# Patient Record
Sex: FEMALE | Race: WHITE | NOT HISPANIC OR LATINO | Employment: UNEMPLOYED | URBAN - METROPOLITAN AREA
[De-identification: names, ages, dates, MRNs, and addresses within clinical notes are randomized per-mention and may not be internally consistent; named-entity substitution may affect disease eponyms.]

---

## 2023-03-22 ENCOUNTER — TELEPHONE (OUTPATIENT)
Dept: PRIMARY CARE | Facility: CLINIC | Age: 73
End: 2023-03-22
Payer: MEDICARE

## 2023-03-22 DIAGNOSIS — Z12.11 ENCOUNTER FOR SCREENING FOR MALIGNANT NEOPLASM OF COLON: Primary | ICD-10-CM

## 2023-03-22 DIAGNOSIS — Z12.31 ENCOUNTER FOR SCREENING MAMMOGRAM FOR MALIGNANT NEOPLASM OF BREAST: ICD-10-CM

## 2023-04-01 PROBLEM — R51.9 HEADACHE: Status: ACTIVE | Noted: 2023-04-01

## 2023-04-01 PROBLEM — R05.9 COUGH: Status: ACTIVE | Noted: 2023-04-01

## 2023-04-01 PROBLEM — M81.0 OSTEOPOROSIS: Status: ACTIVE | Noted: 2023-04-01

## 2023-04-01 PROBLEM — K21.9 GERD (GASTROESOPHAGEAL REFLUX DISEASE): Status: ACTIVE | Noted: 2023-04-01

## 2023-04-01 PROBLEM — I10 HTN (HYPERTENSION): Status: ACTIVE | Noted: 2023-04-01

## 2023-04-01 PROBLEM — M25.551 PAIN OF RIGHT HIP: Status: ACTIVE | Noted: 2023-04-01

## 2023-04-01 PROBLEM — B00.9 HERPES INFECTION: Status: ACTIVE | Noted: 2023-04-01

## 2023-04-01 PROBLEM — G43.109 MIGRAINE EQUIVALENT: Status: ACTIVE | Noted: 2023-04-01

## 2023-04-01 PROBLEM — M54.2 NECK PAIN: Status: ACTIVE | Noted: 2023-04-01

## 2023-04-01 PROBLEM — M15.9 OSTEOARTHRITIS OF MULTIPLE JOINTS: Status: ACTIVE | Noted: 2023-04-01

## 2023-04-01 PROBLEM — E55.9 MILD VITAMIN D DEFICIENCY: Status: ACTIVE | Noted: 2023-04-01

## 2023-04-01 PROBLEM — J32.9 SINUSITIS: Status: ACTIVE | Noted: 2023-04-01

## 2023-04-01 PROBLEM — E78.2 HYPERLIPEMIA, MIXED: Status: ACTIVE | Noted: 2023-04-01

## 2023-04-01 PROBLEM — E53.8 VITAMIN B12 DEFICIENCY: Status: ACTIVE | Noted: 2023-04-01

## 2023-04-01 PROBLEM — L65.9 HAIR LOSS: Status: ACTIVE | Noted: 2023-04-01

## 2023-04-01 PROBLEM — M50.20 HERNIATION OF INTERVERTEBRAL DISC OF CERVICAL REGION: Status: ACTIVE | Noted: 2023-04-01

## 2023-04-01 PROBLEM — M41.9 SCOLIOSIS: Status: ACTIVE | Noted: 2023-04-01

## 2023-04-01 PROBLEM — B34.9 VIRAL ILLNESS: Status: ACTIVE | Noted: 2023-04-01

## 2023-04-01 PROBLEM — R19.7 DIARRHEA: Status: ACTIVE | Noted: 2023-04-01

## 2023-04-01 PROBLEM — R79.9 ABNORMAL BLOOD CHEMISTRY: Status: ACTIVE | Noted: 2023-04-01

## 2023-04-01 PROBLEM — R09.81 NASAL CONGESTION: Status: ACTIVE | Noted: 2023-04-01

## 2023-04-01 PROBLEM — R53.81 MALAISE: Status: ACTIVE | Noted: 2023-04-01

## 2023-04-01 PROBLEM — R00.2 PALPITATIONS: Status: ACTIVE | Noted: 2023-04-01

## 2023-04-01 RX ORDER — SIMVASTATIN 40 MG/1
1 TABLET, FILM COATED ORAL DAILY
COMMUNITY
Start: 2020-11-02 | End: 2024-01-11

## 2023-04-01 RX ORDER — CYANOCOBALAMIN 1000 UG/ML
1000 INJECTION, SOLUTION INTRAMUSCULAR; SUBCUTANEOUS
COMMUNITY
End: 2023-06-09 | Stop reason: WASHOUT

## 2023-04-01 RX ORDER — OMEPRAZOLE 40 MG/1
40 CAPSULE, DELAYED RELEASE ORAL DAILY
COMMUNITY
End: 2023-05-17 | Stop reason: SDUPTHER

## 2023-04-01 RX ORDER — LYSINE HCL 500 MG
2 TABLET ORAL DAILY
COMMUNITY
Start: 2022-01-05 | End: 2023-06-09 | Stop reason: WASHOUT

## 2023-04-01 RX ORDER — ACYCLOVIR 400 MG/1
1 TABLET ORAL DAILY
COMMUNITY
Start: 2021-12-10

## 2023-04-01 RX ORDER — VIT C/E/ZN/COPPR/LUTEIN/ZEAXAN 250MG-90MG
25 CAPSULE ORAL DAILY
COMMUNITY
Start: 2020-01-08 | End: 2023-06-09 | Stop reason: WASHOUT

## 2023-04-01 RX ORDER — AMITRIPTYLINE HYDROCHLORIDE 10 MG/1
10 TABLET, FILM COATED ORAL NIGHTLY
COMMUNITY
End: 2023-04-03 | Stop reason: ALTCHOICE

## 2023-04-03 ENCOUNTER — OFFICE VISIT (OUTPATIENT)
Dept: PRIMARY CARE | Facility: CLINIC | Age: 73
End: 2023-04-03
Payer: MEDICARE

## 2023-04-03 VITALS
SYSTOLIC BLOOD PRESSURE: 118 MMHG | DIASTOLIC BLOOD PRESSURE: 62 MMHG | WEIGHT: 116 LBS | BODY MASS INDEX: 22.78 KG/M2 | HEART RATE: 60 BPM | TEMPERATURE: 97.5 F | RESPIRATION RATE: 16 BRPM | HEIGHT: 60 IN

## 2023-04-03 DIAGNOSIS — E78.2 HYPERLIPEMIA, MIXED: ICD-10-CM

## 2023-04-03 DIAGNOSIS — E53.8 FOLATE DEFICIENCY: ICD-10-CM

## 2023-04-03 DIAGNOSIS — R79.9 ABNORMAL BLOOD CHEMISTRY: ICD-10-CM

## 2023-04-03 DIAGNOSIS — N39.0 URINARY TRACT INFECTION WITHOUT HEMATURIA, SITE UNSPECIFIED: Primary | ICD-10-CM

## 2023-04-03 DIAGNOSIS — R10.9 ABDOMINAL PAIN, UNSPECIFIED ABDOMINAL LOCATION: ICD-10-CM

## 2023-04-03 DIAGNOSIS — Z00.00 ROUTINE GENERAL MEDICAL EXAMINATION AT HEALTH CARE FACILITY: ICD-10-CM

## 2023-04-03 DIAGNOSIS — K21.9 GASTROESOPHAGEAL REFLUX DISEASE, UNSPECIFIED WHETHER ESOPHAGITIS PRESENT: ICD-10-CM

## 2023-04-03 DIAGNOSIS — E78.5 HYPERLIPIDEMIA, UNSPECIFIED HYPERLIPIDEMIA TYPE: ICD-10-CM

## 2023-04-03 DIAGNOSIS — E55.9 MILD VITAMIN D DEFICIENCY: ICD-10-CM

## 2023-04-03 DIAGNOSIS — I10 PRIMARY HYPERTENSION: ICD-10-CM

## 2023-04-03 DIAGNOSIS — E03.9 HYPOTHYROIDISM, UNSPECIFIED TYPE: ICD-10-CM

## 2023-04-03 DIAGNOSIS — R53.81 MALAISE: ICD-10-CM

## 2023-04-03 LAB
APPEARANCE UR: CLEAR
BILIRUB UR QL STRIP: NEGATIVE
COLOR UR: YELLOW
GLUCOSE UR STRIP-MCNC: NEGATIVE MG/DL
HGB UR QL STRIP: ABNORMAL
KETONES UR STRIP-MCNC: NEGATIVE MG/DL
LEUKOCYTE ESTERASE UR QL STRIP: NEGATIVE
NITRITE UR QL STRIP: NEGATIVE
PH UR STRIP: 6.5 [PH]
PROT UR STRIP-MCNC: NEGATIVE MG/DL
SP GR UR STRIP.AUTO: 1.01
UROBILINOGEN UR STRIP-ACNC: 0.2 E.U./DL

## 2023-04-03 PROCEDURE — 83036 HEMOGLOBIN GLYCOSYLATED A1C: CPT | Performed by: INTERNAL MEDICINE

## 2023-04-03 PROCEDURE — 1157F ADVNC CARE PLAN IN RCRD: CPT | Performed by: INTERNAL MEDICINE

## 2023-04-03 PROCEDURE — 99213 OFFICE O/P EST LOW 20 MIN: CPT | Performed by: INTERNAL MEDICINE

## 2023-04-03 PROCEDURE — 82306 VITAMIN D 25 HYDROXY: CPT | Performed by: INTERNAL MEDICINE

## 2023-04-03 PROCEDURE — 1160F RVW MEDS BY RX/DR IN RCRD: CPT | Performed by: INTERNAL MEDICINE

## 2023-04-03 PROCEDURE — 80053 COMPREHEN METABOLIC PANEL: CPT | Performed by: INTERNAL MEDICINE

## 2023-04-03 PROCEDURE — 82746 ASSAY OF FOLIC ACID SERUM: CPT | Performed by: INTERNAL MEDICINE

## 2023-04-03 PROCEDURE — 1170F FXNL STATUS ASSESSED: CPT | Performed by: INTERNAL MEDICINE

## 2023-04-03 PROCEDURE — 3074F SYST BP LT 130 MM HG: CPT | Performed by: INTERNAL MEDICINE

## 2023-04-03 PROCEDURE — 1159F MED LIST DOCD IN RCRD: CPT | Performed by: INTERNAL MEDICINE

## 2023-04-03 PROCEDURE — 1036F TOBACCO NON-USER: CPT | Performed by: INTERNAL MEDICINE

## 2023-04-03 PROCEDURE — 84443 ASSAY THYROID STIM HORMONE: CPT | Performed by: INTERNAL MEDICINE

## 2023-04-03 PROCEDURE — 3078F DIAST BP <80 MM HG: CPT | Performed by: INTERNAL MEDICINE

## 2023-04-03 PROCEDURE — 81003 URINALYSIS AUTO W/O SCOPE: CPT | Performed by: INTERNAL MEDICINE

## 2023-04-03 PROCEDURE — 36415 COLL VENOUS BLD VENIPUNCTURE: CPT | Performed by: INTERNAL MEDICINE

## 2023-04-03 PROCEDURE — 80061 LIPID PANEL: CPT | Performed by: INTERNAL MEDICINE

## 2023-04-03 PROCEDURE — G0439 PPPS, SUBSEQ VISIT: HCPCS | Performed by: INTERNAL MEDICINE

## 2023-04-03 PROCEDURE — 85025 COMPLETE CBC W/AUTO DIFF WBC: CPT | Performed by: INTERNAL MEDICINE

## 2023-04-03 RX ORDER — LISINOPRIL 2.5 MG/1
2.5 TABLET ORAL DAILY
Qty: 30 TABLET | Refills: 11 | Status: SHIPPED | OUTPATIENT
Start: 2023-04-03 | End: 2023-04-10 | Stop reason: SDUPTHER

## 2023-04-03 RX ORDER — DICYCLOMINE HYDROCHLORIDE 10 MG/1
10 CAPSULE ORAL 4 TIMES DAILY PRN
Qty: 30 CAPSULE | Refills: 0 | Status: SHIPPED | OUTPATIENT
Start: 2023-04-03 | End: 2023-04-10 | Stop reason: SDUPTHER

## 2023-04-03 ASSESSMENT — ENCOUNTER SYMPTOMS
ARTHRALGIAS: 1
DYSURIA: 1
HEADACHES: 0
COUGH: 0
APNEA: 0
APPETITE CHANGE: 0
ACTIVITY CHANGE: 0
LOSS OF SENSATION IN FEET: 0
DEPRESSION: 0
SHORTNESS OF BREATH: 0
ABDOMINAL DISTENTION: 0
DIFFICULTY URINATING: 1
OCCASIONAL FEELINGS OF UNSTEADINESS: 0

## 2023-04-03 ASSESSMENT — ACTIVITIES OF DAILY LIVING (ADL)
BATHING: INDEPENDENT
GROOMING: INDEPENDENT
WALKS IN HOME: INDEPENDENT
PATIENT'S MEMORY ADEQUATE TO SAFELY COMPLETE DAILY ACTIVITIES?: YES
DOING HOUSEWORK: INDEPENDENT
HEARING - LEFT EAR: FUNCTIONAL
USING TELEPHONE: INDEPENDENT
DRESSING: INDEPENDENT
FEEDING YOURSELF: INDEPENDENT
USING TRANSPORTATION: INDEPENDENT
STIL DRIVING: YES
PREPARING MEALS: INDEPENDENT
TAKING MEDICATION: INDEPENDENT
ADEQUATE_TO_COMPLETE_ADL: YES
TOILETING: INDEPENDENT
EATING: INDEPENDENT
GROCERY SHOPPING: INDEPENDENT
BATHING: INDEPENDENT
TOILETING: INDEPENDENT
PILL BOX USED: NO
JUDGMENT_ADEQUATE_SAFELY_COMPLETE_DAILY_ACTIVITIES: YES
HEARING - RIGHT EAR: FUNCTIONAL
DRESSING YOURSELF: INDEPENDENT
JUDGMENT_ADEQUATE_SAFELY_COMPLETE_DAILY_ACTIVITIES: YES
FEEDING: INDEPENDENT
MANAGING FINANCES: INDEPENDENT
ADEQUATE_TO_COMPLETE_ADL: YES
NEEDS ASSISTANCE WITH FOOD: INDEPENDENT

## 2023-04-03 ASSESSMENT — COLUMBIA-SUICIDE SEVERITY RATING SCALE - C-SSRS

## 2023-04-03 ASSESSMENT — PATIENT HEALTH QUESTIONNAIRE - PHQ9
SUM OF ALL RESPONSES TO PHQ9 QUESTIONS 1 AND 2: 0
SUM OF ALL RESPONSES TO PHQ9 QUESTIONS 1 AND 2: 0
2. FEELING DOWN, DEPRESSED OR HOPELESS: NOT AT ALL
2. FEELING DOWN, DEPRESSED OR HOPELESS: NOT AT ALL
1. LITTLE INTEREST OR PLEASURE IN DOING THINGS: NOT AT ALL
1. LITTLE INTEREST OR PLEASURE IN DOING THINGS: NOT AT ALL

## 2023-04-03 ASSESSMENT — PAIN SCALES - GENERAL: PAINLEVEL: 0-NO PAIN

## 2023-04-03 NOTE — PROGRESS NOTES
Subjective         Patient is presented for follow up.  Patient is complaining of urgency, frequency.  Has low energy.  Complaining of bilateral axillary discomfort.  In need for blood work.  Weak.    HPI      This is a 73 year old lady with history of scoliosis, hyperlipidemia, chronic gastritis, status post bilateral cataract removal, s/p colonoscopy 2017, s/p COVID 1/12/2021, osteoporosis with lumbar spine T score -4.5 12/2021.  Patient is presented for evaluation and treatment of the above complaints.  Has urgency, frequency.  Has bilateral axillary discomfort.  Weak at time.  In need for Blood work.     Patient is followed by rheumatology.  Currently, taking Reclast.  Mammography is scheduled in 4/2023.  Interested to have her blood work done.     Review of Systems   Constitutional:  Negative for activity change and appetite change.   HENT:  Negative for congestion.    Respiratory:  Negative for apnea, cough and shortness of breath.    Cardiovascular:  Negative for chest pain and leg swelling.   Gastrointestinal:  Negative for abdominal distention.   Genitourinary:  Positive for difficulty urinating and dysuria.   Musculoskeletal:  Positive for arthralgias.   Neurological:  Negative for headaches.       Objective        Vitals:    04/03/23 0907   BP: 118/62   Pulse: 60   Resp: 16   Temp: 36.4 °C (97.5 °F)        Physical Exam  Constitutional:       Appearance: Normal appearance.   HENT:      Head: Normocephalic.      Right Ear: Tympanic membrane normal.      Left Ear: Tympanic membrane normal.      Nose: Nose normal.      Mouth/Throat:      Mouth: Mucous membranes are moist.   Eyes:      Pupils: Pupils are equal, round, and reactive to light.   Cardiovascular:      Rate and Rhythm: Normal rate and regular rhythm.      Pulses: Normal pulses.   Abdominal:      Palpations: Abdomen is soft.   Musculoskeletal:         General: Deformity present.      Cervical back: Normal range of motion.      Comments: Moderate  Scoliosis   Skin:     General: Skin is warm and dry.   Neurological:      General: No focal deficit present.      Mental Status: She is alert and oriented to person, place, and time.   Psychiatric:         Mood and Affect: Mood normal.         Behavior: Behavior normal.         Thought Content: Thought content normal.       Diagnoses and all orders for this visit:  Urinary tract infection without hematuria, site unspecified (Primary)  -     POCT UA (Automated) docked device  Mild vitamin D deficiency  -     Vitamin D 1,25 Dihydroxy  Hyperlipemia, mixed  -     Comprehensive Metabolic Panel  Malaise  -     CBC  Abnormal blood chemistry  -     Hemoglobin A1C  -     Thyroid Stimulating Hormone  Hyperlipidemia, unspecified hyperlipidemia type  -     Lipid Panel  Folate deficiency  -     Folate  Hypothyroidism, unspecified type  -     Thyroid Stimulating Hormone  Primary hypertension  -     lisinopril 2.5 mg tablet; Take 1 tablet (2.5 mg) by mouth once daily.  Gastroesophageal reflux disease, unspecified whether esophagitis present  Abdominal pain, unspecified abdominal location  -     dicyclomine (Bentyl) 10 mg capsule; Take 1 capsule (10 mg) by mouth 4 times a day as needed (abdominal pain or cramps).  Other orders  -     POCT URINALYSIS AUTOMATED     - Bilateral axillary discomfort.  Mammography is in 4/2023.  No masses, tenderness.    - R hip pain.   Resolved.  Take Tylenol as needed.     - Hyperlipidemia.  Take simvastatin as directed.  Keep Mediterranean diet, exercise.     - H of acute sinusitis.  Had completed doxycycline, benzonatate.  Improved.     - Osteoporosis.  Last DEXA scan was done 12/21/2021.  T score at her LS spine -4.5.  Evaluated by rheumatology, had 2 injections of Reclast.  Has incoming appointment with rheumatology January 2023.  Calcium supplements, vitamin D.  Exercises much as you can.     - Headaches episodes.  Discussed with patient about CT scan results.  Started on physical therapy.  Take  amitriptyline daily.  Improved.     - Frequent herpes outbreaks.  Continue with herpes prophylaxis.     - H of COVID -19 illness 1/2021.  Patient has had vaccination in August.  Slowly recovered.     - Hypertension.  Stable.  Continue to take current therapy.   Exercise.  Avoid salt.     - Scoliosis.  Continue with daily exercise.     - Health maintenance.  GYN exam Dr. Sierra.  Mammography is up to date.  Colonoscopy 2017, done at [facility].  Vaccinations.     - Normal Adult weight with BMI 22.65.  Diet, exercise.  Keep ideal BMI less than 25.     Follow up in 1 mon   Alexia Perry MD

## 2023-04-04 NOTE — PROGRESS NOTES
Subjective   Reason for Visit: Sandra Brown is an 73 y.o. female here for a Medicare Wellness visit.     Past Medical, Surgical, and Family History reviewed and updated in chart.    Reviewed all medications by prescribing practitioner or clinical pharmacist (such as prescriptions, OTCs, herbal therapies and supplements) and documented in the medical record.    HPI    Patient Care Team:  Alexia Perry MD as PCP - General  Masood Dubose DO as PCP - [employer] PCP     Review of Systems    Objective   Vitals:  /62   Pulse 60   Temp 36.4 °C (97.5 °F) (Temporal)   Resp 16   Ht 1.524 m (5')   Wt 52.6 kg (116 lb)   BMI 22.65 kg/m²       Physical Exam    Assessment/Plan   Problem List Items Addressed This Visit        Circulatory    HTN (hypertension)    Relevant Medications    lisinopril 2.5 mg tablet       Digestive    GERD (gastroesophageal reflux disease)       Endocrine/Metabolic    Mild vitamin D deficiency    Relevant Orders    Vitamin D 1,25 Dihydroxy (Completed)       Other    Abnormal blood chemistry    Relevant Orders    Hemoglobin A1C (Completed)    Thyroid Stimulating Hormone (Completed)    Hyperlipemia, mixed    Relevant Orders    Comprehensive Metabolic Panel (Completed)    Malaise    Relevant Orders    CBC (Completed)   Other Visit Diagnoses     Urinary tract infection without hematuria, site unspecified    -  Primary    Relevant Orders    POCT UA (Automated) docked device    Hyperlipidemia, unspecified hyperlipidemia type        Relevant Orders    Lipid Panel (Completed)    Folate deficiency        Relevant Orders    Folate (Completed)    Hypothyroidism, unspecified type        Relevant Orders    Thyroid Stimulating Hormone (Completed)    Abdominal pain, unspecified abdominal location        Relevant Medications    dicyclomine (Bentyl) 10 mg capsule    Routine general medical examination at health care facility

## 2023-04-10 ENCOUNTER — TELEPHONE (OUTPATIENT)
Dept: PRIMARY CARE | Facility: CLINIC | Age: 73
End: 2023-04-10
Payer: MEDICARE

## 2023-04-10 DIAGNOSIS — R10.9 ABDOMINAL PAIN, UNSPECIFIED ABDOMINAL LOCATION: ICD-10-CM

## 2023-04-10 DIAGNOSIS — I10 PRIMARY HYPERTENSION: ICD-10-CM

## 2023-04-10 RX ORDER — DICYCLOMINE HYDROCHLORIDE 10 MG/1
10 CAPSULE ORAL 4 TIMES DAILY PRN
Qty: 30 CAPSULE | Refills: 3 | Status: SHIPPED | OUTPATIENT
Start: 2023-04-10 | End: 2023-06-09

## 2023-04-10 RX ORDER — LISINOPRIL 2.5 MG/1
2.5 TABLET ORAL DAILY
Qty: 30 TABLET | Refills: 11 | Status: SHIPPED | OUTPATIENT
Start: 2023-04-10 | End: 2023-12-18 | Stop reason: WASHOUT

## 2023-04-12 LAB — URINE CULTURE: NORMAL

## 2023-04-21 ENCOUNTER — OFFICE VISIT (OUTPATIENT)
Dept: PRIMARY CARE | Facility: CLINIC | Age: 73
End: 2023-04-21
Payer: MEDICARE

## 2023-04-21 DIAGNOSIS — Z12.11 ENCOUNTER FOR SCREENING FOR MALIGNANT NEOPLASM OF COLON: ICD-10-CM

## 2023-04-21 DIAGNOSIS — N76.1 CHRONIC VAGINITIS: Primary | ICD-10-CM

## 2023-04-21 DIAGNOSIS — R19.7 DIARRHEA, UNSPECIFIED TYPE: ICD-10-CM

## 2023-04-21 PROCEDURE — 1160F RVW MEDS BY RX/DR IN RCRD: CPT | Performed by: INTERNAL MEDICINE

## 2023-04-21 PROCEDURE — 1157F ADVNC CARE PLAN IN RCRD: CPT | Performed by: INTERNAL MEDICINE

## 2023-04-21 PROCEDURE — 1159F MED LIST DOCD IN RCRD: CPT | Performed by: INTERNAL MEDICINE

## 2023-04-21 PROCEDURE — 3078F DIAST BP <80 MM HG: CPT | Performed by: INTERNAL MEDICINE

## 2023-04-21 PROCEDURE — 3074F SYST BP LT 130 MM HG: CPT | Performed by: INTERNAL MEDICINE

## 2023-04-21 PROCEDURE — 99213 OFFICE O/P EST LOW 20 MIN: CPT | Performed by: INTERNAL MEDICINE

## 2023-04-21 PROCEDURE — 1036F TOBACCO NON-USER: CPT | Performed by: INTERNAL MEDICINE

## 2023-04-21 RX ORDER — LOPERAMIDE HCL 2 MG
2 TABLET ORAL 4 TIMES DAILY PRN
Qty: 30 TABLET | Refills: 0 | Status: SHIPPED | OUTPATIENT
Start: 2023-04-21 | End: 2023-05-01

## 2023-04-21 RX ORDER — ESTRADIOL 10 UG/1
10 INSERT VAGINAL 2 TIMES WEEKLY
Qty: 8 TABLET | Refills: 2 | COMMUNITY
Start: 2023-04-24 | End: 2023-08-17 | Stop reason: WASHOUT

## 2023-04-21 NOTE — PROGRESS NOTES
Subjective    Sandra Brown is a 73 y.o. female who presents for follow up.  Seen by DR Leal.  Patient is interested for follow up.    HPI  This is a 73 year old lady with history of scoliosis, hyperlipidemia, chronic gastritis, status post bilateral cataract removal, s/p colonoscopy 2017, s/p COVID 1/12/2021, osteoporosis with lumbar spine T score -4.5 12/2021.  Patient is presented for evaluation and treatment of the above complaints.  Followed  by Gynecology/ urology.  Has had transvaginal, abdominal ultrasound.  Patient has increase uterine thickness, due to Biopsy.     Patient is followed by rheumatology.  Currently, taking Reclast.  Mammography is scheduled in 4/2023.        Review of Systems   Constitutional:  Negative for activity change, appetite change and unexpected weight change.   Respiratory:  Negative for cough and shortness of breath.    Gastrointestinal:  Positive for diarrhea. Negative for abdominal distention and abdominal pain.   Genitourinary:  Positive for dysuria and urgency.   Musculoskeletal:  Positive for back pain.       Objective        Vitals:    04/21/23 1011   BP: 118/62   Pulse: 64   Resp: 16   Temp: 36.4 °C (97.5 °F)        Physical Exam  HENT:      Head: Normocephalic.      Nose: Nose normal.      Mouth/Throat:      Mouth: Mucous membranes are moist.   Cardiovascular:      Rate and Rhythm: Normal rate.   Pulmonary:      Effort: Pulmonary effort is normal.   Abdominal:      Palpations: Abdomen is soft.   Musculoskeletal:      Right lower leg: No edema.      Left lower leg: No edema.   Skin:     General: Skin is warm.   Neurological:      General: No focal deficit present.      Mental Status: She is alert.   Psychiatric:         Mood and Affect: Mood normal.       Diagnoses and all orders for this visit:  Chronic vaginitis (Primary)  -     estradiol (Vagifem) 10 mcg tablet vaginal tablet; Insert 1 tablet (10 mcg) into the vagina 2 times a week.  Diarrhea, unspecified type  -      loperamide (Imodium A-D) 2 mg tablet; Take 1 tablet (2 mg) by mouth 4 times a day as needed for diarrhea for up to 10 days.  Encounter for screening for malignant neoplasm of colon  -     Colonoscopy; Future       Evaluated by Dr correa.  Had pelvic ultrasound  done, has increased endometrium, up to 9 mm, advised to have  Biopsy done.    - Bilateral axillary discomfort.  Mammography is in 6/7//2023.  No masses, tenderness.     - R hip pain.   Resolved.  Take Tylenol as needed.     - Hyperlipidemia.  Take simvastatin as directed.  Keep Mediterranean diet, exercise.     - H of acute sinusitis.  Had completed doxycycline, benzonatate.  Improved.     - Osteoporosis.  Last DEXA scan was done 12/21/2021.  T score at her LS spine -4.5.  Evaluated by rheumatology, had 2 injections of Reclast.  Has incoming appointment with rheumatology January 2023.  Calcium supplements, vitamin D.  Exercises much as you can.     - Headaches episodes.  Discussed with patient about CT scan results.  Started on physical therapy.  Take amitriptyline daily.  Improved.     - Frequent herpes outbreaks.  Continue with herpes prophylaxis.     - H of COVID -19 illness 1/2021.  Patient has had vaccination in August.  Slowly recovered.     - Hypertension.  Stable.  Continue to take current therapy.   Exercise.  Avoid salt.     - Scoliosis.  Continue with daily exercise.     - Health maintenance.  GYN exam Dr. Sierra.  Mammography is up to date.  Colonoscopy 2017, done at [place].  Vaccinations.     - Normal Adult weight with BMI 22.26.  Diet, exercise.  Keep ideal BMI less than 25.     Follow up in 1 mon      Alexia Perry MD

## 2023-04-23 VITALS
DIASTOLIC BLOOD PRESSURE: 62 MMHG | WEIGHT: 114 LBS | HEART RATE: 64 BPM | RESPIRATION RATE: 16 BRPM | TEMPERATURE: 97.5 F | HEIGHT: 60 IN | BODY MASS INDEX: 22.38 KG/M2 | SYSTOLIC BLOOD PRESSURE: 118 MMHG

## 2023-04-23 ASSESSMENT — ENCOUNTER SYMPTOMS
ABDOMINAL DISTENTION: 0
DYSURIA: 1
ABDOMINAL PAIN: 0
UNEXPECTED WEIGHT CHANGE: 0
ACTIVITY CHANGE: 0
BACK PAIN: 1
APPETITE CHANGE: 0
DIARRHEA: 1
COUGH: 0
SHORTNESS OF BREATH: 0

## 2023-05-04 LAB
CREATININE (MG/DL) IN SER/PLAS: 0.73 MG/DL (ref 0.5–1.05)
GFR FEMALE: 88 ML/MIN/1.73M2
POC CALCIUM IONIZED (MMOL/L) IN BLOOD: 1.31 MMOL/L (ref 1.1–1.33)

## 2023-05-10 ENCOUNTER — HOSPITAL ENCOUNTER (OUTPATIENT)
Dept: DATA CONVERSION | Facility: HOSPITAL | Age: 73
End: 2023-05-10
Attending: OBSTETRICS & GYNECOLOGY | Admitting: OBSTETRICS & GYNECOLOGY
Payer: MEDICARE

## 2023-05-10 DIAGNOSIS — M81.0 AGE-RELATED OSTEOPOROSIS WITHOUT CURRENT PATHOLOGICAL FRACTURE: ICD-10-CM

## 2023-05-10 DIAGNOSIS — E78.5 HYPERLIPIDEMIA, UNSPECIFIED: ICD-10-CM

## 2023-05-10 DIAGNOSIS — I10 ESSENTIAL (PRIMARY) HYPERTENSION: ICD-10-CM

## 2023-05-10 DIAGNOSIS — K21.9 GASTRO-ESOPHAGEAL REFLUX DISEASE WITHOUT ESOPHAGITIS: ICD-10-CM

## 2023-05-10 DIAGNOSIS — N84.0 POLYP OF CORPUS UTERI: ICD-10-CM

## 2023-05-10 DIAGNOSIS — R93.89 ABNORMAL FINDINGS ON DIAGNOSTIC IMAGING OF OTHER SPECIFIED BODY STRUCTURES: ICD-10-CM

## 2023-05-10 DIAGNOSIS — M15.9 POLYOSTEOARTHRITIS, UNSPECIFIED: ICD-10-CM

## 2023-05-10 DIAGNOSIS — N93.8 OTHER SPECIFIED ABNORMAL UTERINE AND VAGINAL BLEEDING: ICD-10-CM

## 2023-05-16 LAB
COMPLETE PATHOLOGY REPORT: NORMAL
CONVERTED CLINICAL DIAGNOSIS-HISTORY: NORMAL
CONVERTED FINAL DIAGNOSIS: NORMAL
CONVERTED FINAL REPORT PDF LINK TO COPY AND PASTE: NORMAL
CONVERTED GROSS DESCRIPTION: NORMAL

## 2023-05-17 ENCOUNTER — TELEPHONE (OUTPATIENT)
Dept: PRIMARY CARE | Facility: CLINIC | Age: 73
End: 2023-05-17
Payer: MEDICARE

## 2023-05-17 DIAGNOSIS — K21.9 GASTROESOPHAGEAL REFLUX DISEASE, UNSPECIFIED WHETHER ESOPHAGITIS PRESENT: Primary | ICD-10-CM

## 2023-05-17 RX ORDER — OMEPRAZOLE 40 MG/1
40 CAPSULE, DELAYED RELEASE ORAL DAILY
Qty: 90 CAPSULE | Refills: 2 | Status: SHIPPED | OUTPATIENT
Start: 2023-05-17 | End: 2023-05-22

## 2023-05-24 LAB — URINE CULTURE: NORMAL

## 2023-06-09 ENCOUNTER — OFFICE VISIT (OUTPATIENT)
Dept: PRIMARY CARE | Facility: CLINIC | Age: 73
End: 2023-06-09
Payer: MEDICARE

## 2023-06-09 VITALS — TEMPERATURE: 98.6 F | BODY MASS INDEX: 22.38 KG/M2 | HEIGHT: 60 IN | WEIGHT: 114 LBS

## 2023-06-09 DIAGNOSIS — R10.13 EPIGASTRIC PAIN: Primary | ICD-10-CM

## 2023-06-09 PROCEDURE — 1160F RVW MEDS BY RX/DR IN RCRD: CPT | Performed by: INTERNAL MEDICINE

## 2023-06-09 PROCEDURE — 1157F ADVNC CARE PLAN IN RCRD: CPT | Performed by: INTERNAL MEDICINE

## 2023-06-09 PROCEDURE — 1159F MED LIST DOCD IN RCRD: CPT | Performed by: INTERNAL MEDICINE

## 2023-06-09 PROCEDURE — 1036F TOBACCO NON-USER: CPT | Performed by: INTERNAL MEDICINE

## 2023-06-09 PROCEDURE — 99213 OFFICE O/P EST LOW 20 MIN: CPT | Performed by: INTERNAL MEDICINE

## 2023-06-09 RX ORDER — FAMOTIDINE 40 MG/1
40 TABLET, FILM COATED ORAL 2 TIMES DAILY
Qty: 60 TABLET | Refills: 0 | Status: SHIPPED | OUTPATIENT
Start: 2023-06-09 | End: 2023-08-17 | Stop reason: WASHOUT

## 2023-06-09 ASSESSMENT — ENCOUNTER SYMPTOMS
VOMITING: 0
DIARRHEA: 0
NAUSEA: 0
ABDOMINAL PAIN: 1
ABDOMINAL DISTENTION: 0
ACTIVITY CHANGE: 0
CONSTIPATION: 0

## 2023-06-09 NOTE — PROGRESS NOTES
Subjective    Sandra Brown is a 73 y.o. female who presents for  evaluation of abdominal discomfort, spasm like pain.    HPI    This is a 73 year old lady with history of scoliosis, hyperlipidemia, chronic gastritis, status post bilateral cataract removal, s/p colonoscopy 2017, s/p COVID 1/12/2021, osteoporosis with lumbar spine T score -4.5 12/2021.  Patient is presented for evaluation and treatment of the above complaints.  Has been having epigastric spasm, pain.  Interested to see GI.    Followed  by Gynecology/ urology.  Patient has increase uterine thickness,  had  Biopsy done, was negative     Patient is followed by rheumatology.  Currently, taking Reclast, next injection 5/2024.  Mammography is scheduled in 4/2023.     Review of Systems   Constitutional:  Negative for activity change.   Gastrointestinal:  Positive for abdominal pain. Negative for abdominal distention, constipation, diarrhea, nausea and vomiting.       Objective        Vitals:    06/09/23 1340   Temp: 37 °C (98.6 °F)        Physical Exam  Constitutional:       Appearance: Normal appearance.   HENT:      Head: Normocephalic and atraumatic.      Mouth/Throat:      Mouth: Mucous membranes are moist.   Pulmonary:      Breath sounds: Normal breath sounds.   Abdominal:      Palpations: Abdomen is soft.   Musculoskeletal:         General: Normal range of motion.      Cervical back: Normal range of motion.   Skin:     General: Skin is warm.   Neurological:      General: No focal deficit present.      Mental Status: She is alert.   Psychiatric:         Mood and Affect: Mood normal.       Diagnoses and all orders for this visit:  Epigastric pain (Primary)  -     EGD; Future  -     famotidine (Pepcid) 40 mg tablet; Take 1 tablet (40 mg) by mouth 2 times a day.      Take Pepsid, Omeprazole.     Evaluated by Dr Martinez.  Had pelvic ultrasound  done, has increased endometrium, up to 9 mm, s/p   Biopsy, negative.     - Bilateral axillary  discomfort.  Mammography is in 6/7//2023.  No masses, tenderness.     - R hip pain.   Resolved.  Take Tylenol as needed.     - Hyperlipidemia.  Take simvastatin as directed.  Keep Mediterranean diet, exercise.     - H of acute sinusitis.  Had completed doxycycline, benzonatate.  Improved.     - Osteoporosis.  Last DEXA scan was done 12/21/2021.  T score at her LS spine -4.5.  Evaluated by rheumatology, had 2 injections of Reclast.  Has incoming appointment with rheumatology January 2023.  Calcium supplements, vitamin D.  Exercises much as you can.     - Headaches episodes.  Discussed with patient about CT scan results.  Started on physical therapy.  Take amitriptyline daily.  Improved.     - Frequent herpes outbreaks.  Continue with herpes prophylaxis.     - H of COVID -19 illness 1/2021.  Patient has had vaccination in August.  Slowly recovered.     - Hypertension.  Stable.  Continue to take current therapy.   Exercise.  Avoid salt.     - Scoliosis.  Continue with daily exercise.     - Health maintenance.  GYN exam Dr. Sierra.  Mammography is up to date.  Colonoscopy 2017, done at [place].  Vaccinations.     - Normal Adult weight with BMI 22.26.  Diet, exercise.  Keep ideal BMI less than 25.      Alexia Perry MD

## 2023-08-17 ENCOUNTER — OFFICE VISIT (OUTPATIENT)
Dept: PRIMARY CARE | Facility: CLINIC | Age: 73
End: 2023-08-17
Payer: MEDICARE

## 2023-08-17 VITALS
TEMPERATURE: 97 F | WEIGHT: 110 LBS | BODY MASS INDEX: 21.6 KG/M2 | HEART RATE: 62 BPM | SYSTOLIC BLOOD PRESSURE: 132 MMHG | RESPIRATION RATE: 16 BRPM | HEIGHT: 60 IN | DIASTOLIC BLOOD PRESSURE: 72 MMHG

## 2023-08-17 DIAGNOSIS — N39.0 URINARY TRACT INFECTION WITHOUT HEMATURIA, SITE UNSPECIFIED: Primary | ICD-10-CM

## 2023-08-17 LAB
APPEARANCE UR: CLEAR
BILIRUB UR QL STRIP: NEGATIVE
COLOR UR: YELLOW
GLUCOSE UR STRIP-MCNC: NEGATIVE MG/DL
HGB UR QL STRIP: ABNORMAL
KETONES UR STRIP-MCNC: NEGATIVE MG/DL
LEUKOCYTE ESTERASE UR QL STRIP: ABNORMAL
NITRITE UR QL STRIP: NEGATIVE
PH UR STRIP: 5.5 [PH]
PROT UR STRIP-MCNC: NEGATIVE MG/DL
SP GR UR STRIP.AUTO: <=1.005
UROBILINOGEN UR STRIP-ACNC: 0.2 E.U./DL

## 2023-08-17 PROCEDURE — 1036F TOBACCO NON-USER: CPT | Performed by: INTERNAL MEDICINE

## 2023-08-17 PROCEDURE — 81003 URINALYSIS AUTO W/O SCOPE: CPT | Performed by: INTERNAL MEDICINE

## 2023-08-17 PROCEDURE — 1159F MED LIST DOCD IN RCRD: CPT | Performed by: INTERNAL MEDICINE

## 2023-08-17 PROCEDURE — 1160F RVW MEDS BY RX/DR IN RCRD: CPT | Performed by: INTERNAL MEDICINE

## 2023-08-17 PROCEDURE — 1157F ADVNC CARE PLAN IN RCRD: CPT | Performed by: INTERNAL MEDICINE

## 2023-08-17 PROCEDURE — 3075F SYST BP GE 130 - 139MM HG: CPT | Performed by: INTERNAL MEDICINE

## 2023-08-17 PROCEDURE — 3078F DIAST BP <80 MM HG: CPT | Performed by: INTERNAL MEDICINE

## 2023-08-17 PROCEDURE — 1125F AMNT PAIN NOTED PAIN PRSNT: CPT | Performed by: INTERNAL MEDICINE

## 2023-08-17 PROCEDURE — 99213 OFFICE O/P EST LOW 20 MIN: CPT | Performed by: INTERNAL MEDICINE

## 2023-08-17 PROCEDURE — 87086 URINE CULTURE/COLONY COUNT: CPT

## 2023-08-17 RX ORDER — ESTRADIOL 0.1 MG/G
CREAM VAGINAL
COMMUNITY
Start: 2023-06-05

## 2023-08-17 RX ORDER — AMITRIPTYLINE HYDROCHLORIDE 10 MG/1
1 TABLET, FILM COATED ORAL NIGHTLY
COMMUNITY
Start: 2021-12-08 | End: 2023-10-10

## 2023-08-17 RX ORDER — CIPROFLOXACIN 500 MG/1
500 TABLET ORAL 2 TIMES DAILY
Qty: 20 TABLET | Refills: 0 | Status: SHIPPED | OUTPATIENT
Start: 2023-08-17 | End: 2023-08-27

## 2023-08-17 RX ORDER — CIPROFLOXACIN 500 MG/1
500 TABLET ORAL 2 TIMES DAILY
Qty: 20 TABLET | Refills: 0 | Status: SHIPPED | OUTPATIENT
Start: 2023-08-17 | End: 2023-08-17 | Stop reason: SDUPTHER

## 2023-08-17 ASSESSMENT — ENCOUNTER SYMPTOMS
NAUSEA: 0
VOMITING: 0
ABDOMINAL DISTENTION: 0
DIARRHEA: 0
CONSTIPATION: 0
LOSS OF SENSATION IN FEET: 0
ACTIVITY CHANGE: 0
DEPRESSION: 0
ABDOMINAL PAIN: 1
OCCASIONAL FEELINGS OF UNSTEADINESS: 0

## 2023-08-17 NOTE — PROGRESS NOTES
Subjective    Sandra Brown is a 73 y.o. female who presents for  evaluation of abdominal discomfort, spasm like pain.  Has urgency, frequency.    HPI    This is a 73 year old lady with history of scoliosis, hyperlipidemia, chronic gastritis, status post bilateral cataract removal, s/p colonoscopy 2017, s/p COVID 1/12/2021, osteoporosis with lumbar spine T score -4.5 12/2021.  Patient is presented for evaluation and treatment of the above complaints.  Has been having  urgency, spasm like pain.  Patient was taking macrodantin, still has symptoms.    Followed  by Gynecology/ urology.  Patient has increase uterine thickness,  had  Biopsy done, was negative     Patient is followed by rheumatology.  Currently, taking Reclast, next injection 5/2024.  Mammography is scheduled in 4/2023.     Review of Systems   Constitutional:  Negative for activity change.   Gastrointestinal:  Positive for abdominal pain. Negative for abdominal distention, constipation, diarrhea, nausea and vomiting.       Objective        Vitals:    08/17/23 1214   BP: 132/72   Pulse: 62   Resp: 16   Temp: 36.1 °C (97 °F)        Physical Exam  Constitutional:       Appearance: Normal appearance.   HENT:      Head: Normocephalic and atraumatic.      Mouth/Throat:      Mouth: Mucous membranes are moist.   Pulmonary:      Breath sounds: Normal breath sounds.   Abdominal:      Palpations: Abdomen is soft.   Musculoskeletal:         General: Normal range of motion.      Cervical back: Normal range of motion.   Skin:     General: Skin is warm.   Neurological:      General: No focal deficit present.      Mental Status: She is alert.   Psychiatric:         Mood and Affect: Mood normal.       Diagnoses and all orders for this visit:  Urinary tract infection without hematuria, site unspecified (Primary)  -     POCT UA (Automated) docked device  -     Discontinue: ciprofloxacin (Cipro) 500 mg tablet; Take 1 tablet (500 mg) by mouth 2 times a day for 10 days.  -      Urine Culture  -     ciprofloxacin (Cipro) 500 mg tablet; Take 1 tablet (500 mg) by mouth 2 times a day for 10 days.  Other orders  -     POCT URINALYSIS AUTOMATED       -Pelvic discomfort.  Took antibiotics, developed diarrhea.  Resolved now.     - S/p EGD 6/15/2023.  No significant abnormalities.     - Pelvic discomfort.  Evaluated by Dr Martinez.  Had pelvic ultrasound  done, has increased endometrium, up to 9 mm, s/p   Biopsy, negative.  Next appointment 11/2023     - Bilateral axillary discomfort.  Mammography is in 6/7//2023.  No masses, tenderness.     - R hip pain.   Resolved.  Take Tylenol as needed.     - Hyperlipidemia.  Take simvastatin as directed.  Keep Mediterranean diet, exercise.     - H of acute sinusitis.  Had completed doxycycline, benzonatate.  Improved.     - Osteoporosis.  Last DEXA scan was done 12/21/2021.  T score at her LS spine -4.5.  Evaluated by rheumatology, had 2 injections of Reclast.  Has incoming appointment with rheumatology January 2023.  Calcium supplements, vitamin D.  Exercises much as you can.     - Headaches episodes.  Discussed with patient about CT scan results.  Started on physical therapy.  Take amitriptyline daily.  Improved.     - Frequent herpes outbreaks.  Continue with herpes prophylaxis.     - H of COVID -19 illness 1/2021.  Patient has had vaccination in August.  Slowly recovered.     - Hypertension.  Stable.  Continue to take current therapy.   Exercise.  Avoid salt.     - Scoliosis.  Continue with daily exercise.     - Health maintenance.  GYN exam Dr. Sierra.  Mammography is up to date.  Colonoscopy 2017, done at [place].  Vaccinations.     - Normal Adult weight with BMI 21.48.  Diet, exercise.  Keep ideal BMI less than 25.      Alexia Perry MD

## 2023-08-18 ENCOUNTER — TELEPHONE (OUTPATIENT)
Dept: PRIMARY CARE | Facility: CLINIC | Age: 73
End: 2023-08-18
Payer: MEDICARE

## 2023-08-18 LAB — URINE CULTURE: NORMAL

## 2023-09-14 NOTE — H&P
History & Physical Reviewed:   I have reviewed the History and Physical dated:  24-Apr-2023   History and Physical reviewed and relevant findings noted. Patient examined to review pertinent physical  findings.: No significant changes   Home Medications Reviewed: no changes noted   Allergies Reviewed: no changes noted       ERAS (Enhanced Recovery After Surgery):  ·  ERAS Patient: no     Consent:   COVID-19 Consent:  ·  COVID-19 Risk Consent Surgeon has reviewed key risks related to the risk of emmanuelle COVID-19 and if they contract COVID-19 what the risks are.       Electronic Signatures:  Vale Martinez)  (Signed 09-May-2023 10:16)   Authored: History & Physical Reviewed, ERAS, Consent,  Note Completion      Last Updated: 09-May-2023 10:16 by Vale Martinez)

## 2023-09-19 DIAGNOSIS — K21.9 GASTROESOPHAGEAL REFLUX DISEASE, UNSPECIFIED WHETHER ESOPHAGITIS PRESENT: ICD-10-CM

## 2023-09-19 RX ORDER — OMEPRAZOLE 40 MG/1
40 CAPSULE, DELAYED RELEASE ORAL DAILY
Qty: 30 CAPSULE | Refills: 0 | Status: SHIPPED | OUTPATIENT
Start: 2023-09-19 | End: 2023-11-09

## 2023-10-02 NOTE — OP NOTE
Post Operative Note:     PreOp Diagnosis: thickened endometrium   Post-Procedure Diagnosis: same   Procedure: 1. dilation and curettage  2. hysteroscopy  3. polypectomy  4.   5.   Surgeon: AMANDA Martinez MD   Resident/Fellow/Other Assistant: none   Anesthesia: MAC   I.V. Fluids: 300   Estimated Blood Loss (mL): 40   Specimen: yes. 1) Em curettings, 2) Em polyp   Findings: small uterine perforation medial to left  cornua, posterior endometrial polyp resected   Patient Returned To/Condition: PACU stable   Urine Output: 100     Operative Report Dictated:  Dictation: yes   Dictated by: Juan   Date of Dictation: 10-May-2023   Dictation job number: [id number]     Attestation:   Note Completion:  Attending Attestation I performed the procedure without a resident         Electronic Signatures:  Vale Martinez)  (Signed 10-May-2023 09:54)   Authored: Post Operative Note, Note Completion      Last Updated: 10-May-2023 09:54 by Vale Martinez)

## 2023-10-10 DIAGNOSIS — R51.9 INTRACTABLE HEADACHE, UNSPECIFIED CHRONICITY PATTERN, UNSPECIFIED HEADACHE TYPE: ICD-10-CM

## 2023-10-10 DIAGNOSIS — R51.9 INTRACTABLE HEADACHE, UNSPECIFIED CHRONICITY PATTERN, UNSPECIFIED HEADACHE TYPE: Primary | ICD-10-CM

## 2023-10-10 RX ORDER — AMITRIPTYLINE HYDROCHLORIDE 10 MG/1
10 TABLET, FILM COATED ORAL NIGHTLY
Qty: 90 TABLET | Refills: 3 | Status: SHIPPED | OUTPATIENT
Start: 2023-10-10

## 2023-10-10 RX ORDER — AMITRIPTYLINE HYDROCHLORIDE 10 MG/1
10 TABLET, FILM COATED ORAL NIGHTLY
Qty: 90 TABLET | Refills: 3 | Status: SHIPPED | OUTPATIENT
Start: 2023-10-10 | End: 2023-10-10 | Stop reason: SDUPTHER

## 2023-11-01 NOTE — PROGRESS NOTES
Urogynecology  Provider:  Vale Martinez MD  [phone]      ASSESSMENT AND PLAN:   74yo with OAB, hematuria, thickened endometrium, vaginal atrophy, and pelvic pain. Comorbidities include: HTN, HLD, GERD, and osteoporosis.     Diagnoses:   #1 Hematuria  #2 Overactive bladder, urinary frequency    Plan:   1. AMH  - UA today with +blood. Will send urine for culture and UA microscopy to further evaluate.   - Will plan to proceed with hematuria workup to evaluate the upper  tract (I.e. ordering CT urogram or BL kidney U/S) to evaluate the kidneys/ureters and lower  tract (I.e. will schedule cystoscopy) to evaluate the urethra and bladder for etiology of hematuria.     2. OAB, urinary frequency  - We discussed the etiology of OAB with neurological aging.   - We discussed OAB lifestyle changes (i.e., trying to limit fluids to 60-80oz in total per day, timed voiding every 2-3 hours, stop drinking fluids 3 hours prior to bedtime, and reducing bladder irritants such as caffeine, nicotine, artificial sweeteners, acidic/spicy foods, and alcohol).  - Counseled with using E2 it may improve OAB sx by up to 30%. She will look into this as she previously found that Vagifem/Yuvafem tablets were cost-prohibitive but is planning to switch healthcare insurance soon.   - She is amenable to trying an OAB medication on a PRN basis.   - Sent Rx of Trospium 20mg to her preferred pharmacy with instructions to take 1 to 1.5 pills po daily. This medication will help improve bladder compliance by decreasing intensity of the bladder spasms thus improving urinary urgency. Discussed possible medication side effects including dry mouth, dry eyes, and constipation.    Follow up in 4-6 weeks with Dr. Martinez.     Bertaibe Attestation  By signing my name below, IMarcello Scribe, attest that this documentation has been prepared under the direction and in the presence of Vale Martinez MD.     Problem List Items Addressed This  Visit    None          I spent a total of eConsult Time: 25 minutes in face to face and non face to face time.        Vale Martinez MD        HISTORY OF PRESENT ILLNESS:   72yo presenting in follow up.     Record Review:   - 5/2023 D&C for thickened EM  Pathology: Endometrial polyp  Had some frequency and urgency after with no growth culture  Repeat culture on 8/2023 also NGTD     Urinary Symptoms:   - Reports new onset of recently experiencing urinary frequency with voiding several times per day.   - Denies UUI.     Vaginal Symptoms:  - She is not using any vaginal estrogen cream. She previously used E2 but Vagifem tablets were cost-prohibitive.   - No vaginal dryness.   - Denies vaginal bleeding.     Past Medical History:    - HTN, GERD, and osteoporosis.   No past medical history on file.       Past Surgical History:     - Cataracts  Past Surgical History:   Procedure Laterality Date    OTHER SURGICAL HISTORY  01/03/2020    Cataract surgery         Medications:     Prior to Admission medications    Medication Sig Start Date End Date Taking? Authorizing Provider   acyclovir (Zovirax) 400 mg tablet Take 1 tablet (400 mg) by mouth once daily. 12/10/21   Historical Provider, MD   amitriptyline (Elavil) 10 mg tablet Take 1 tablet (10 mg) by mouth once daily at bedtime. 10/10/23   Alexia Perry MD   estradiol (Estrace) 0.01 % (0.1 mg/gram) vaginal cream APPLY ONE GRAM TO VAGINA TWO NIGHTS PER WEEK 6/5/23   Historical Provider, MD   lisinopril 2.5 mg tablet Take 1 tablet (2.5 mg) by mouth once daily. 4/10/23 8/17/23  Alexia Perry MD   omeprazole (PriLOSEC) 40 mg DR capsule Take 1 capsule (40 mg) by mouth once daily. Do not crush or chew. 9/19/23   Sapna Romero PA-C   simvastatin (Zocor) 40 mg tablet Take 1 tablet (40 mg) by mouth once daily. 11/2/20   Historical Provider, MD ZHENG  Review of Systems   Constitutional: Negative.    HENT: Negative.     Eyes: Negative.    Respiratory: Negative.    "  Cardiovascular: Negative.    Gastrointestinal: Negative.    Endocrine: Negative.    Genitourinary:  Positive for frequency and urgency.   Hematological: Negative.    Psychiatric/Behavioral: Negative.          POCT Blood, Urine   Date Value Ref Range Status   08/17/2023 SMALL (1+) (A) NEGATIVE Final     POCT Nitrite, Urine   Date Value Ref Range Status   08/17/2023 NEGATIVE NEGATIVE Final     POCT Urobilinogen, Urine   Date Value Ref Range Status   08/17/2023 0.2 0.2, 1.0 E.U./dL Final       PHYSICAL EXAM:    There were no vitals taken for this visit.  Postmenopausal.     Declines chaperone for physical exam.    Well developed, well nourished, in no apparent distress.   Neurologic/Psychiatric:  Awake, Alert and Oriented times 3.  Affect normal.     GENITAL/URINARY:     External Genitalia:  The patient has normal appearing external genitalia, normal skenes and bartholins glands, and a normal hair distribution.  Her vulva is without lesions, erythema or discharge.  It is non-tender with appropriate sensation.     Urethral Meatus:  Size normal, Location normal, Lesions absent, Prolapse absent,      Urethra:  Fullness absent, Masses absent,      Bladder:  Fullness absent, Masses absent, Tenderness absent,      Vagina:  General appearance normal, atrophic vaginal epithelium, Discharge absent, Lesions absent.    Cervix: Normal, no discharge.   Uterus:  normal size, mobile, and nontender     Anus/Perineum:  Normal Perineum      POP-Q:  Stage: 0  Position: sitting        Data and DIAGNOSTIC STUDIES REVIEWED   No results found.   Lab Results   Component Value Date    URINECULTURE **Culture Comments - See Below 08/17/2023      Lab Results   Component Value Date    CREATININE 0.73 05/04/2023   No results found for: \"WBC\", \"HGB\", \"HCT\", \"MCV\", \"PLT\"     Agree with above   I Dr. Martinez, personally performed the services described in the documentation which was scribed virtually and confirm it is both complete and " accurate.  Vale Martinez MD

## 2023-11-02 ENCOUNTER — OFFICE VISIT (OUTPATIENT)
Dept: OBSTETRICS AND GYNECOLOGY | Facility: CLINIC | Age: 73
End: 2023-11-02
Payer: MEDICARE

## 2023-11-02 VITALS
BODY MASS INDEX: 20.43 KG/M2 | HEIGHT: 62 IN | DIASTOLIC BLOOD PRESSURE: 71 MMHG | SYSTOLIC BLOOD PRESSURE: 132 MMHG | WEIGHT: 111 LBS

## 2023-11-02 DIAGNOSIS — N32.81 OVERACTIVE BLADDER: ICD-10-CM

## 2023-11-02 DIAGNOSIS — R31.29 HEMATURIA, MICROSCOPIC: ICD-10-CM

## 2023-11-02 DIAGNOSIS — N39.9 URINARY DISORDER: Primary | ICD-10-CM

## 2023-11-02 LAB
BILIRUBIN, POC: NEGATIVE
BLOOD URINE, POC: POSITIVE
CLARITY, POC: CLEAR
COLOR, POC: YELLOW
GLUCOSE URINE, POC: NEGATIVE
HOLD SPECIMEN: NORMAL
KETONES, POC: NEGATIVE
LEUKOCYTE EST, POC: NEGATIVE
NITRITE, POC: NEGATIVE
PH, POC: 6
SPECIFIC GRAVITY, POC: 1.01
URINE PROTEIN, POC: NEGATIVE
UROBILINOGEN, POC: NEGATIVE

## 2023-11-02 PROCEDURE — 3078F DIAST BP <80 MM HG: CPT | Performed by: OBSTETRICS & GYNECOLOGY

## 2023-11-02 PROCEDURE — 1036F TOBACCO NON-USER: CPT | Performed by: OBSTETRICS & GYNECOLOGY

## 2023-11-02 PROCEDURE — 81001 URINALYSIS AUTO W/SCOPE: CPT | Performed by: OBSTETRICS & GYNECOLOGY

## 2023-11-02 PROCEDURE — 1159F MED LIST DOCD IN RCRD: CPT | Performed by: OBSTETRICS & GYNECOLOGY

## 2023-11-02 PROCEDURE — 1126F AMNT PAIN NOTED NONE PRSNT: CPT | Performed by: OBSTETRICS & GYNECOLOGY

## 2023-11-02 PROCEDURE — 99202 OFFICE O/P NEW SF 15 MIN: CPT | Performed by: OBSTETRICS & GYNECOLOGY

## 2023-11-02 PROCEDURE — 3075F SYST BP GE 130 - 139MM HG: CPT | Performed by: OBSTETRICS & GYNECOLOGY

## 2023-11-02 PROCEDURE — 99212 OFFICE O/P EST SF 10 MIN: CPT | Performed by: OBSTETRICS & GYNECOLOGY

## 2023-11-02 PROCEDURE — 1160F RVW MEDS BY RX/DR IN RCRD: CPT | Performed by: OBSTETRICS & GYNECOLOGY

## 2023-11-02 RX ORDER — TROSPIUM CHLORIDE 20 MG/1
10 TABLET, FILM COATED ORAL 2 TIMES DAILY PRN
Qty: 30 TABLET | Refills: 2 | Status: SHIPPED | OUTPATIENT
Start: 2023-11-02 | End: 2023-11-07

## 2023-11-02 ASSESSMENT — ENCOUNTER SYMPTOMS
FREQUENCY: 1
EYES NEGATIVE: 1
RESPIRATORY NEGATIVE: 1
PSYCHIATRIC NEGATIVE: 1
ENDOCRINE NEGATIVE: 1
CONSTITUTIONAL NEGATIVE: 1
HEMATOLOGIC/LYMPHATIC NEGATIVE: 1
CARDIOVASCULAR NEGATIVE: 1
GASTROINTESTINAL NEGATIVE: 1

## 2023-11-02 ASSESSMENT — PATIENT HEALTH QUESTIONNAIRE - PHQ9
2. FEELING DOWN, DEPRESSED OR HOPELESS: NOT AT ALL
1. LITTLE INTEREST OR PLEASURE IN DOING THINGS: NOT AT ALL
SUM OF ALL RESPONSES TO PHQ9 QUESTIONS 1 AND 2: 0

## 2023-11-02 ASSESSMENT — PAIN SCALES - GENERAL: PAINLEVEL: 0-NO PAIN

## 2023-11-03 LAB
APPEARANCE UR: CLEAR
BILIRUB UR STRIP.AUTO-MCNC: NEGATIVE MG/DL
COLOR UR: ABNORMAL
GLUCOSE UR STRIP.AUTO-MCNC: NEGATIVE MG/DL
KETONES UR STRIP.AUTO-MCNC: NEGATIVE MG/DL
LEUKOCYTE ESTERASE UR QL STRIP.AUTO: NEGATIVE
MUCOUS THREADS #/AREA URNS AUTO: NORMAL /LPF
NITRITE UR QL STRIP.AUTO: NEGATIVE
PH UR STRIP.AUTO: 6 [PH]
PROT UR STRIP.AUTO-MCNC: NEGATIVE MG/DL
RBC # UR STRIP.AUTO: ABNORMAL /UL
RBC #/AREA URNS AUTO: NORMAL /HPF
SP GR UR STRIP.AUTO: 1.01
UROBILINOGEN UR STRIP.AUTO-MCNC: <2 MG/DL
WBC #/AREA URNS AUTO: NORMAL /HPF

## 2023-11-06 DIAGNOSIS — N32.81 OVERACTIVE BLADDER: ICD-10-CM

## 2023-11-07 RX ORDER — TROSPIUM CHLORIDE 20 MG/1
10 TABLET, FILM COATED ORAL 2 TIMES DAILY PRN
Qty: 30 TABLET | Refills: 2 | Status: SHIPPED | OUTPATIENT
Start: 2023-11-07

## 2023-11-09 DIAGNOSIS — K21.9 GASTROESOPHAGEAL REFLUX DISEASE, UNSPECIFIED WHETHER ESOPHAGITIS PRESENT: ICD-10-CM

## 2023-11-09 RX ORDER — OMEPRAZOLE 40 MG/1
40 CAPSULE, DELAYED RELEASE ORAL DAILY
Qty: 30 CAPSULE | Refills: 6 | Status: SHIPPED | OUTPATIENT
Start: 2023-11-09 | End: 2023-11-10 | Stop reason: SDUPTHER

## 2023-11-10 ENCOUNTER — TELEPHONE (OUTPATIENT)
Dept: PRIMARY CARE | Facility: CLINIC | Age: 73
End: 2023-11-10
Payer: MEDICARE

## 2023-11-10 ENCOUNTER — ANCILLARY PROCEDURE (OUTPATIENT)
Dept: RADIOLOGY | Facility: CLINIC | Age: 73
End: 2023-11-10
Payer: MEDICARE

## 2023-11-10 DIAGNOSIS — R31.29 HEMATURIA, MICROSCOPIC: ICD-10-CM

## 2023-11-10 DIAGNOSIS — K21.9 GASTROESOPHAGEAL REFLUX DISEASE, UNSPECIFIED WHETHER ESOPHAGITIS PRESENT: ICD-10-CM

## 2023-11-10 PROCEDURE — 76770 US EXAM ABDO BACK WALL COMP: CPT

## 2023-11-10 RX ORDER — OMEPRAZOLE 40 MG/1
40 CAPSULE, DELAYED RELEASE ORAL DAILY
Qty: 30 CAPSULE | Refills: 6 | Status: SHIPPED | OUTPATIENT
Start: 2023-11-10 | End: 2023-12-18 | Stop reason: SDUPTHER

## 2023-11-14 DIAGNOSIS — R31.21 ASYMPTOMATIC MICROSCOPIC HEMATURIA: Primary | ICD-10-CM

## 2023-11-21 ENCOUNTER — APPOINTMENT (OUTPATIENT)
Dept: PRIMARY CARE | Facility: CLINIC | Age: 73
End: 2023-11-21
Payer: MEDICARE

## 2023-11-30 ENCOUNTER — LAB (OUTPATIENT)
Dept: LAB | Facility: LAB | Age: 73
End: 2023-11-30
Payer: MEDICARE

## 2023-11-30 DIAGNOSIS — R31.21 ASYMPTOMATIC MICROSCOPIC HEMATURIA: ICD-10-CM

## 2023-11-30 LAB
CREAT SERPL-MCNC: 0.75 MG/DL (ref 0.5–1.05)
GFR SERPL CREATININE-BSD FRML MDRD: 85 ML/MIN/1.73M*2

## 2023-11-30 PROCEDURE — 36415 COLL VENOUS BLD VENIPUNCTURE: CPT

## 2023-11-30 PROCEDURE — 82565 ASSAY OF CREATININE: CPT

## 2023-12-01 ENCOUNTER — ANCILLARY PROCEDURE (OUTPATIENT)
Dept: RADIOLOGY | Facility: CLINIC | Age: 73
End: 2023-12-01
Payer: MEDICARE

## 2023-12-01 DIAGNOSIS — R31.21 ASYMPTOMATIC MICROSCOPIC HEMATURIA: ICD-10-CM

## 2023-12-01 PROCEDURE — 2550000001 HC RX 255 CONTRASTS: Performed by: OBSTETRICS & GYNECOLOGY

## 2023-12-01 PROCEDURE — 76377 3D RENDER W/INTRP POSTPROCES: CPT | Performed by: RADIOLOGY

## 2023-12-01 PROCEDURE — 74178 CT ABD&PLV WO CNTR FLWD CNTR: CPT | Performed by: RADIOLOGY

## 2023-12-01 PROCEDURE — 74178 CT ABD&PLV WO CNTR FLWD CNTR: CPT

## 2023-12-01 RX ADMIN — IOHEXOL 75 ML: 350 INJECTION, SOLUTION INTRAVENOUS at 12:27

## 2023-12-18 ENCOUNTER — OFFICE VISIT (OUTPATIENT)
Dept: PRIMARY CARE | Facility: CLINIC | Age: 73
End: 2023-12-18
Payer: MEDICARE

## 2023-12-18 VITALS
TEMPERATURE: 97.1 F | SYSTOLIC BLOOD PRESSURE: 128 MMHG | HEIGHT: 62 IN | RESPIRATION RATE: 16 BRPM | BODY MASS INDEX: 20.8 KG/M2 | HEART RATE: 64 BPM | DIASTOLIC BLOOD PRESSURE: 78 MMHG | WEIGHT: 113 LBS

## 2023-12-18 DIAGNOSIS — K21.9 GASTROESOPHAGEAL REFLUX DISEASE, UNSPECIFIED WHETHER ESOPHAGITIS PRESENT: ICD-10-CM

## 2023-12-18 DIAGNOSIS — Z78.0 ASYMPTOMATIC MENOPAUSAL STATE: ICD-10-CM

## 2023-12-18 DIAGNOSIS — Z12.31 ENCOUNTER FOR SCREENING MAMMOGRAM FOR MALIGNANT NEOPLASM OF BREAST: Primary | ICD-10-CM

## 2023-12-18 PROCEDURE — 1160F RVW MEDS BY RX/DR IN RCRD: CPT | Performed by: INTERNAL MEDICINE

## 2023-12-18 PROCEDURE — 3074F SYST BP LT 130 MM HG: CPT | Performed by: INTERNAL MEDICINE

## 2023-12-18 PROCEDURE — 99213 OFFICE O/P EST LOW 20 MIN: CPT | Performed by: INTERNAL MEDICINE

## 2023-12-18 PROCEDURE — 1126F AMNT PAIN NOTED NONE PRSNT: CPT | Performed by: INTERNAL MEDICINE

## 2023-12-18 PROCEDURE — 3078F DIAST BP <80 MM HG: CPT | Performed by: INTERNAL MEDICINE

## 2023-12-18 PROCEDURE — 1036F TOBACCO NON-USER: CPT | Performed by: INTERNAL MEDICINE

## 2023-12-18 PROCEDURE — 1159F MED LIST DOCD IN RCRD: CPT | Performed by: INTERNAL MEDICINE

## 2023-12-18 RX ORDER — OMEPRAZOLE 40 MG/1
40 CAPSULE, DELAYED RELEASE ORAL DAILY
Qty: 30 CAPSULE | Refills: 6 | Status: SHIPPED | OUTPATIENT
Start: 2023-12-18 | End: 2023-12-18 | Stop reason: SDUPTHER

## 2023-12-18 RX ORDER — OMEPRAZOLE 40 MG/1
40 CAPSULE, DELAYED RELEASE ORAL DAILY
Qty: 90 CAPSULE | Refills: 3 | Status: SHIPPED | OUTPATIENT
Start: 2023-12-18 | End: 2024-03-17

## 2023-12-18 ASSESSMENT — ENCOUNTER SYMPTOMS
ACTIVITY CHANGE: 1
FREQUENCY: 1
HEADACHES: 1
ABDOMINAL PAIN: 1

## 2023-12-18 ASSESSMENT — PAIN SCALES - GENERAL: PAINLEVEL: 0-NO PAIN

## 2023-12-18 NOTE — PROGRESS NOTES
Subjective    Sandra Brown is a 73 y.o. female who presents for  follow up.  Interested to have Blood work.    HPI    This is a 73 year old lady with history of scoliosis, hyperlipidemia, chronic gastritis, status post bilateral cataract removal, s/p colonoscopy 2017, s/p COVID 1/12/2021, osteoporosis with lumbar spine T score -4.5 12/2021.  Patient is presented for evaluation and treatment of the above complaints.     Followed  by Gynecology/ urology.  Patient has increase uterine thickness,  had  Biopsy done, was negative     Patient is followed by rheumatology.  Currently, taking Reclast, next injection 5/2024.  Mammography is scheduled in 4/2023.    Review of Systems   Constitutional:  Positive for activity change.   Gastrointestinal:  Positive for abdominal pain.   Genitourinary:  Positive for frequency.   Neurological:  Positive for headaches.       Objective        Vitals:    12/18/23 1303   BP: 128/78   Pulse: 64   Resp: 16   Temp: 36.2 °C (97.1 °F)        Physical Exam  HENT:      Head: Normocephalic.      Nose: Nose normal.      Mouth/Throat:      Mouth: Mucous membranes are moist.   Eyes:      Extraocular Movements: Extraocular movements intact.      Pupils: Pupils are equal, round, and reactive to light.   Cardiovascular:      Rate and Rhythm: Normal rate and regular rhythm.   Pulmonary:      Breath sounds: Normal breath sounds.   Abdominal:      Palpations: Abdomen is soft.   Skin:     General: Skin is warm.   Neurological:      General: No focal deficit present.      Mental Status: She is alert. Mental status is at baseline.       Diagnoses and all orders for this visit:  Encounter for screening mammogram for malignant neoplasm of breast (Primary)  -     BI mammo bilateral screening tomosynthesis; Future  Gastroesophageal reflux disease, unspecified whether esophagitis present  -     Discontinue: omeprazole (PriLOSEC) 40 mg DR capsule; Take 1 capsule (40 mg) by mouth once daily. Swallow whole. Do  not crush or chew.  -     omeprazole (PriLOSEC) 40 mg DR capsule; Take 1 capsule (40 mg) by mouth once daily. Swallow whole. Do not crush or chew.  Asymptomatic menopausal state  -     XR DEXA bone density; Future     Pelvic discomfort.  Took antibiotics, developed diarrhea.  Resolved now.      - S/p EGD 6/15/2023.  No significant abnormalities.      - Pelvic discomfort.  Followed by Dr Martinez.  Had pelvic ultrasound  done, has increased endometrium, up to 9 mm, s/p   Biopsy, negative.     - Bilateral axillary discomfort.  Mammography is in 6/7//2023.  No masses, tenderness.    R sided  back skin lesion.  See dermatology .    - R hip pain.   Resolved.  Take Tylenol as needed.     - Hyperlipidemia.  Take simvastatin as directed.  Keep Mediterranean diet, exercise.     - H of acute sinusitis.  Had completed doxycycline, benzonatate.  Improved.     - Osteoporosis.  Last DEXA scan was done 12/21/2021, repeat.  T score at her LS spine -4.5.  Evaluated by rheumatology, had 2 injections of Reclast.  Has incoming appointment with rheumatology January 2023.  Calcium supplements, vitamin D.  Exercises much as you can.     - Headaches episodes.  Discussed with patient about CT scan results.  Started on physical therapy.  Take amitriptyline daily.  Improved.     - Frequent herpes outbreaks.  Continue with herpes prophylaxis.     - H of COVID -19 illness 1/2021.  Patient has had vaccination in August.  Slowly recovered.     - Hypertension.  Stable.  Continue to take current therapy.   Exercise.  Avoid salt.     - Scoliosis.  Continue with daily exercise.     - Health maintenance.  GYN exam Dr. Sierra.  Mammography is up to date.  Colonoscopy 2017, done at [place].  Vaccinations.     - Normal Adult weight with BMI 20.67  Diet, exercise.  Keep ideal BMI less than 25.     Alexia Perry MD

## 2023-12-21 ENCOUNTER — CLINICAL SUPPORT (OUTPATIENT)
Dept: PRIMARY CARE | Facility: CLINIC | Age: 73
End: 2023-12-21
Payer: MEDICARE

## 2023-12-21 DIAGNOSIS — E78.2 HYPERLIPEMIA, MIXED: ICD-10-CM

## 2023-12-21 DIAGNOSIS — E55.9 MILD VITAMIN D DEFICIENCY: ICD-10-CM

## 2023-12-21 DIAGNOSIS — E53.8 VITAMIN B12 DEFICIENCY: ICD-10-CM

## 2023-12-21 DIAGNOSIS — E03.9 HYPOTHYROIDISM, UNSPECIFIED TYPE: ICD-10-CM

## 2023-12-21 DIAGNOSIS — R73.9 HYPERGLYCEMIA: ICD-10-CM

## 2023-12-21 DIAGNOSIS — R53.81 MALAISE: Primary | ICD-10-CM

## 2023-12-21 DIAGNOSIS — M19.90 ARTHRITIS: ICD-10-CM

## 2023-12-21 DIAGNOSIS — R53.81 MALAISE: ICD-10-CM

## 2023-12-21 LAB
ERYTHROCYTE [SEDIMENTATION RATE] IN BLOOD BY WESTERGREN METHOD: 22 MM/H (ref 0–30)
RHEUMATOID FACT SER NEPH-ACNC: <10 IU/ML (ref 0–15)
URATE SERPL-MCNC: 3 MG/DL (ref 2.3–6.7)

## 2023-12-21 PROCEDURE — 36415 COLL VENOUS BLD VENIPUNCTURE: CPT

## 2023-12-21 PROCEDURE — 85025 COMPLETE CBC W/AUTO DIFF WBC: CPT | Performed by: INTERNAL MEDICINE

## 2023-12-21 PROCEDURE — 82306 VITAMIN D 25 HYDROXY: CPT | Performed by: INTERNAL MEDICINE

## 2023-12-21 PROCEDURE — 83036 HEMOGLOBIN GLYCOSYLATED A1C: CPT | Performed by: INTERNAL MEDICINE

## 2023-12-21 PROCEDURE — 86235 NUCLEAR ANTIGEN ANTIBODY: CPT

## 2023-12-21 PROCEDURE — 86225 DNA ANTIBODY NATIVE: CPT

## 2023-12-21 PROCEDURE — 80053 COMPREHEN METABOLIC PANEL: CPT | Performed by: INTERNAL MEDICINE

## 2023-12-21 PROCEDURE — 85652 RBC SED RATE AUTOMATED: CPT

## 2023-12-21 PROCEDURE — 86038 ANTINUCLEAR ANTIBODIES: CPT

## 2023-12-21 PROCEDURE — 84443 ASSAY THYROID STIM HORMONE: CPT | Performed by: INTERNAL MEDICINE

## 2023-12-21 PROCEDURE — 84550 ASSAY OF BLOOD/URIC ACID: CPT

## 2023-12-21 PROCEDURE — 86431 RHEUMATOID FACTOR QUANT: CPT

## 2023-12-21 PROCEDURE — 82607 VITAMIN B-12: CPT | Performed by: INTERNAL MEDICINE

## 2023-12-21 PROCEDURE — 80061 LIPID PANEL: CPT | Performed by: INTERNAL MEDICINE

## 2023-12-22 ENCOUNTER — TELEPHONE (OUTPATIENT)
Dept: PRIMARY CARE | Facility: CLINIC | Age: 73
End: 2023-12-22
Payer: MEDICARE

## 2023-12-22 DIAGNOSIS — R10.9 ABDOMINAL PAIN, UNSPECIFIED ABDOMINAL LOCATION: Primary | ICD-10-CM

## 2023-12-22 LAB
ANA PATTERN: ABNORMAL
ANA SER QL HEP2 SUBST: POSITIVE
ANA TITR SER IF: ABNORMAL {TITER}
CENTROMERE B AB SER-ACNC: <0.2 AI
CHROMATIN AB SERPL-ACNC: <0.2 AI
DSDNA AB SER-ACNC: <1 IU/ML
ENA JO1 AB SER QL IA: <0.2 AI
ENA RNP AB SER IA-ACNC: 0.8 AI
ENA SCL70 AB SER QL IA: <0.2 AI
ENA SM AB SER IA-ACNC: <0.2 AI
ENA SM+RNP AB SER QL IA: <0.2 AI
ENA SS-A AB SER IA-ACNC: <0.2 AI
ENA SS-B AB SER IA-ACNC: <0.2 AI
RIBOSOMAL P AB SER-ACNC: <0.2 AI

## 2023-12-22 NOTE — TELEPHONE ENCOUNTER
Discussed with patient about blood work results.  Patient has elevated cholesterol 234, LDL is 129.  Has elevated calcium 10.2 and elevated total protein 8.7.  Monitor closely.  Interested to see gastroenterology.

## 2023-12-22 NOTE — PROGRESS NOTES
Subjective    Sandra Brown is a 73 y.o. female who presents for No chief complaint on file..  HPI    Review of Systems    Objective      There were no vitals filed for this visit.     Physical Exam  There are no diagnoses linked to this encounter.    Alexia Perry MD

## 2024-01-10 DIAGNOSIS — E78.2 HYPERLIPEMIA, MIXED: Primary | ICD-10-CM

## 2024-01-11 RX ORDER — SIMVASTATIN 40 MG/1
40 TABLET, FILM COATED ORAL DAILY
Qty: 90 TABLET | Refills: 2 | Status: SHIPPED | OUTPATIENT
Start: 2024-01-11

## 2024-01-15 ENCOUNTER — APPOINTMENT (OUTPATIENT)
Dept: OBSTETRICS AND GYNECOLOGY | Facility: CLINIC | Age: 74
End: 2024-01-15
Payer: MEDICARE

## 2024-01-18 ENCOUNTER — PROCEDURE VISIT (OUTPATIENT)
Dept: OBSTETRICS AND GYNECOLOGY | Facility: CLINIC | Age: 74
End: 2024-01-18
Payer: MEDICARE

## 2024-01-18 VITALS — SYSTOLIC BLOOD PRESSURE: 124 MMHG | WEIGHT: 113 LBS | BODY MASS INDEX: 20.67 KG/M2 | DIASTOLIC BLOOD PRESSURE: 80 MMHG

## 2024-01-18 DIAGNOSIS — R31.21 ASYMPTOMATIC MICROSCOPIC HEMATURIA: Primary | ICD-10-CM

## 2024-01-18 LAB
POC APPEARANCE, URINE: CLEAR
POC BILIRUBIN, URINE: NEGATIVE
POC BLOOD, URINE: ABNORMAL
POC COLOR, URINE: YELLOW
POC GLUCOSE, URINE: NEGATIVE MG/DL
POC KETONES, URINE: NEGATIVE MG/DL
POC LEUKOCYTES, URINE: NEGATIVE
POC NITRITE,URINE: NEGATIVE
POC PH, URINE: 5.5 PH
POC PROTEIN, URINE: NEGATIVE MG/DL
POC SPECIFIC GRAVITY, URINE: 1.02
POC UROBILINOGEN, URINE: 0.2 EU/DL

## 2024-01-18 PROCEDURE — 81003 URINALYSIS AUTO W/O SCOPE: CPT | Performed by: OBSTETRICS & GYNECOLOGY

## 2024-01-18 PROCEDURE — 99213 OFFICE O/P EST LOW 20 MIN: CPT | Performed by: OBSTETRICS & GYNECOLOGY

## 2024-01-18 PROCEDURE — 52000 CYSTOURETHROSCOPY: CPT | Performed by: OBSTETRICS & GYNECOLOGY

## 2024-01-18 PROCEDURE — 2500000005 HC RX 250 GENERAL PHARMACY W/O HCPCS: Performed by: OBSTETRICS & GYNECOLOGY

## 2024-01-18 PROCEDURE — 2500000004 HC RX 250 GENERAL PHARMACY W/ HCPCS (ALT 636 FOR OP/ED): Performed by: OBSTETRICS & GYNECOLOGY

## 2024-01-18 RX ORDER — LIDOCAINE HYDROCHLORIDE 20 MG/ML
1 JELLY TOPICAL ONCE
Status: COMPLETED | OUTPATIENT
Start: 2024-01-19 | End: 2024-01-18

## 2024-01-18 RX ORDER — NITROFURANTOIN 25; 75 MG/1; MG/1
100 CAPSULE ORAL ONCE
Status: COMPLETED | OUTPATIENT
Start: 2024-01-18 | End: 2024-01-18

## 2024-01-18 RX ADMIN — NITROFURANTOIN MONOHYDRATE AND NITROFURANTOIN MACROCRYSTALLINE 100 MG: 75; 25 CAPSULE ORAL at 14:40

## 2024-01-18 RX ADMIN — LIDOCAINE HYDROCHLORIDE 1 APPLICATION: 20 JELLY TOPICAL at 14:45

## 2024-01-18 ASSESSMENT — PAIN SCALES - GENERAL: PAINLEVEL: 0-NO PAIN

## 2024-01-18 NOTE — PROGRESS NOTES
TChief Complaint    Patient here for cystoscopy  for evaluation of hematuria.  CT urogram showed simple renal cysts only    HPI:  Pt here for cysto for hematuria    The patient gave a urine sample which was checked for infection and found to be negative.  Pt was numbed for 20 min with lidojet inserted in to the bladder and given 100 mg po pyridium.    The patient was consented for cytoscopy..     Using a flexible scope cystoscopy was performed. The entire bladder was visualized including the ureteral orfice and the internal urethal orfice.  No masses, stones or foreign bodies were noted. No bleeding was noted    The procedure was completed, the patient allowed to empty her bladder and get dressed.    Post-procedure precautions were given to the patient and was given a dose of macrodantin 100 mg po x 1    Follow up in 6 months with Dr. Martinez     23 min with pt    Vale Martinez MD

## 2024-01-29 ENCOUNTER — HOSPITAL ENCOUNTER (OUTPATIENT)
Dept: RADIOLOGY | Facility: CLINIC | Age: 74
Discharge: HOME | End: 2024-01-29
Payer: MEDICARE

## 2024-01-29 DIAGNOSIS — Z78.0 ASYMPTOMATIC MENOPAUSAL STATE: ICD-10-CM

## 2024-02-05 ENCOUNTER — APPOINTMENT (OUTPATIENT)
Dept: RHEUMATOLOGY | Facility: CLINIC | Age: 74
End: 2024-02-05
Payer: MEDICARE

## 2024-02-16 ENCOUNTER — HOSPITAL ENCOUNTER (OUTPATIENT)
Dept: RADIOLOGY | Facility: CLINIC | Age: 74
Discharge: HOME | End: 2024-02-16
Payer: MEDICARE

## 2024-02-16 PROCEDURE — 77080 DXA BONE DENSITY AXIAL: CPT

## 2024-02-16 PROCEDURE — 77080 DXA BONE DENSITY AXIAL: CPT | Performed by: RADIOLOGY

## 2024-03-05 ENCOUNTER — APPOINTMENT (OUTPATIENT)
Dept: RHEUMATOLOGY | Facility: CLINIC | Age: 74
End: 2024-03-05
Payer: MEDICARE

## 2024-03-06 ENCOUNTER — OFFICE VISIT (OUTPATIENT)
Dept: RHEUMATOLOGY | Facility: CLINIC | Age: 74
End: 2024-03-06
Payer: MEDICARE

## 2024-03-06 VITALS — SYSTOLIC BLOOD PRESSURE: 124 MMHG | DIASTOLIC BLOOD PRESSURE: 76 MMHG | BODY MASS INDEX: 21.03 KG/M2 | WEIGHT: 115 LBS

## 2024-03-06 DIAGNOSIS — M81.0 OSTEOPOROSIS, UNSPECIFIED OSTEOPOROSIS TYPE, UNSPECIFIED PATHOLOGICAL FRACTURE PRESENCE: Primary | ICD-10-CM

## 2024-03-06 DIAGNOSIS — R76.8 ANA POSITIVE: ICD-10-CM

## 2024-03-06 PROCEDURE — 99214 OFFICE O/P EST MOD 30 MIN: CPT | Performed by: INTERNAL MEDICINE

## 2024-03-06 PROCEDURE — 3078F DIAST BP <80 MM HG: CPT | Performed by: INTERNAL MEDICINE

## 2024-03-06 PROCEDURE — 1126F AMNT PAIN NOTED NONE PRSNT: CPT | Performed by: INTERNAL MEDICINE

## 2024-03-06 PROCEDURE — 3074F SYST BP LT 130 MM HG: CPT | Performed by: INTERNAL MEDICINE

## 2024-03-06 PROCEDURE — 1157F ADVNC CARE PLAN IN RCRD: CPT | Performed by: INTERNAL MEDICINE

## 2024-03-06 PROCEDURE — 1036F TOBACCO NON-USER: CPT | Performed by: INTERNAL MEDICINE

## 2024-03-06 PROCEDURE — 1159F MED LIST DOCD IN RCRD: CPT | Performed by: INTERNAL MEDICINE

## 2024-03-06 NOTE — PROGRESS NOTES
"Recheck  OA  /  OP  ( Due for Reclast in May - [facility] )  Doing well     15 min late  HPI - She gets intermittent R forearm pain and tingling \"every since I had the vaccine\"  Per my old note, she had PT for cervical radic and takes elavil, which helps.  She does some stretches in bed - she saw on a youtube video that showed exercises for OP.  She wants to skip reclast this yr because she has \"a problem with my tooth\" - she gets pain.   She sees dentist every 3 mo for a cleaning but didn't have this pain at the last time she saw the dentist.   No fx since last OV.  She isn't taking Ca because her primary told her that her calcium is high (10.2)  NO other partic pain.  No swelling.  No AM stiffness.  No fever, HA, sicca, mouth sores, raynauds, rash, CP, or resp.  +GERD    PE  Gen - NAD  Neck - supple, no LAD  CV - RRR no r/m/g  Lungs - CTA  Abd - +BS  Extr - 2+ DP, PT, and rad pulses.  No edema  Psych - nl affect  Neuro - nl strength.  Reflexes 2+ symmetric  Msk - no synovitis    A/P - OP - due for reclast #3 in may.  She may need dental work.  She will call once she finds out, and depending on the work needed, we may need to postpone reclast although not for 1 yr.  Check labs prior to reclast.  She has minimal incr Ca 10.2 ULN 10.1.  Discussed dietary Ca intake.  Recommended walking rather than stretching in bed for OP, although stretches in bed may help with stiffness  KATHY 1:640/panel neg.  ESR and RF neg.  Pt wasn't aware that primary checked KATHY and doesn't know why it was checked.  I cannot find reason why in chart, although perhaps I am just not looking in the right place.  She does not have s/s of CTD.  Nothing further needs to be done at this point  Reeval yrly or sooner PRN  "

## 2024-04-19 NOTE — OP NOTE
PREOPERATIVE DIAGNOSIS:  Thickened endometrium.    POSTOPERATIVE DIAGNOSIS:  Endometrial polyp.    OPERATION/PROCEDURE:  Dilation and curettage, hysteroscopy, polypectomy.    SURGEON:  Vale Martinez MD.    ASSISTANT(S):  No assistants.    ANESTHESIA:  MAC.    IV FLUIDS:  400 mL lactated Ringer's.    ESTIMATED BLOOD LOSS:  40 mL.  Urine output 100 mL by straight cath.    COMPLICATIONS:  None.    SPECIMENS:  1. Endometrial curettings.  2. Endometrial polyp.      DISPOSITION:  To PACU.    HISTORY OF PRESENT ILLNESS:  The patient is a vaginal-nulliparous  female who presented  with pelvic pain.  On workup of her pelvic pain, a pelvic ultrasound  demonstrated significantly thickened endometrium for a postmenopausal  woman at 10 mm.  We discussed the need to pursue treatment of this.  She was amenable to this.     The patient was consented and taken to the operating room where IV  sedation was obtained without difficulty.  The patient was placed in  the dorsal lithotomy position, legs in Yellofins stirrups, with  careful attention not to place undue pressure on the posterior calf  muscles.  Sequential compression devices were placed on both lower  extremities prior to placement of stirrups.  The patient was given 2  g of Ancef for antibiotic prophylaxis.  The patient was prepped and  draped in normal sterile fashion.  The patient was straight-cathed  with a red rubber catheter for 100 mL.  The patient was nulliparous  with no significant prolapse.  A weighted speculum was placed in the  posterior vagina, and the anterior lip of the cervix grasped.  The  uterus was noted to be slightly retroflexed.  The cervix was noted to  be significantly stenotic.  Lacrimal duct probes were then used to  gently dilate the cervix and then continued with the normal uterine  dilators.  The Aveta hysteroscope was then inserted.  There was noted  to be a small perforation at the left upper aspect of the uterus  medial  to the cornua with patent, intact ostia bilaterally.  There  was also noted to be a moderate-sized posterior uterine wall polyp.  There was no active bleeding from the site of the perforation.  This  was a small complication as inherent risk to the procedure,  especially in a stenotic postmenopausal cervix.  At this point given  hemostasis of the small uterine perforation, gentle curettage was  performed carefully, careful to stay within the uterus.  The cry of  the uterus was appreciated, and scant to no tissue was obtained.  This was passed off the field as specimen #1, endometrial curettings.   At this point, the polypectomy forceps were carefully inserted,  again being careful to stay within the uterus itself and the  endometrial polyp removed without issue.  This was passed off the  field as specimen 2, endometrial polyp.  The hysteroscopy was  repeated and the uterus noted to be intact.  No bleeding.  The small  perforation site was also noted to be hemostatic.  Given normal  hysteroscopy, the scope was removed.  The single-tooth tenaculum that  had been placed on the anterior lip of the cervix was removed.  Good  hemostasis was obtained by applying pressure to the site.  The  patient was then cleaned, awoken from IV sedation, and taken to the  recovery room in good condition.  No complaints to recover  postoperatively.  All sponge, lap, and needle counts correct x2.  Dr. Martinez was present and scrubbed for the entire procedure.       Vale Martinez MD    DD:  05/10/2023 09:53:57 EST  DT:  05/11/2023 02:19:33 EST  DICTATION NUMBER:  [id number]  INTERNAL JOB NUMBER:  [id number]    CC:  Vale Martinez MD, Fax: [phone]       Electronic Signatures:  Vale Martinez) (Signed on 16-May-2023 10:07)   Authored   Unsigned, Draft (SYS GENERATED) (Entered on 11-May-2023 02:19)   Entered     Last Updated: 16-May-2023 10:07 by Vale Martinez)

## 2024-05-15 ENCOUNTER — TELEPHONE (OUTPATIENT)
Dept: RHEUMATOLOGY | Facility: CLINIC | Age: 74
End: 2024-05-15
Payer: MEDICARE

## 2024-05-15 NOTE — TELEPHONE ENCOUNTER
Called to let you know she will be putting off Reclast until she has completed some significant dental work.

## 2024-06-12 ENCOUNTER — TELEPHONE (OUTPATIENT)
Dept: PRIMARY CARE | Facility: CLINIC | Age: 74
End: 2024-06-12
Payer: MEDICARE

## 2024-07-02 ENCOUNTER — APPOINTMENT (OUTPATIENT)
Dept: PRIMARY CARE | Facility: CLINIC | Age: 74
End: 2024-07-02
Payer: MEDICARE

## 2024-07-02 VITALS
SYSTOLIC BLOOD PRESSURE: 118 MMHG | WEIGHT: 115 LBS | TEMPERATURE: 97 F | HEART RATE: 64 BPM | DIASTOLIC BLOOD PRESSURE: 62 MMHG | RESPIRATION RATE: 16 BRPM | BODY MASS INDEX: 22.58 KG/M2 | HEIGHT: 60 IN

## 2024-07-02 DIAGNOSIS — R19.7 DIARRHEA, UNSPECIFIED TYPE: Primary | ICD-10-CM

## 2024-07-02 DIAGNOSIS — Z00.00 ROUTINE GENERAL MEDICAL EXAMINATION AT HEALTH CARE FACILITY: ICD-10-CM

## 2024-07-02 DIAGNOSIS — R09.81 NASAL CONGESTION: ICD-10-CM

## 2024-07-02 PROCEDURE — 1160F RVW MEDS BY RX/DR IN RCRD: CPT | Performed by: INTERNAL MEDICINE

## 2024-07-02 PROCEDURE — 1159F MED LIST DOCD IN RCRD: CPT | Performed by: INTERNAL MEDICINE

## 2024-07-02 PROCEDURE — 3074F SYST BP LT 130 MM HG: CPT | Performed by: INTERNAL MEDICINE

## 2024-07-02 PROCEDURE — 1036F TOBACCO NON-USER: CPT | Performed by: INTERNAL MEDICINE

## 2024-07-02 PROCEDURE — G0439 PPPS, SUBSEQ VISIT: HCPCS | Performed by: INTERNAL MEDICINE

## 2024-07-02 PROCEDURE — 99213 OFFICE O/P EST LOW 20 MIN: CPT | Performed by: INTERNAL MEDICINE

## 2024-07-02 PROCEDURE — 1126F AMNT PAIN NOTED NONE PRSNT: CPT | Performed by: INTERNAL MEDICINE

## 2024-07-02 PROCEDURE — 3078F DIAST BP <80 MM HG: CPT | Performed by: INTERNAL MEDICINE

## 2024-07-02 PROCEDURE — 1157F ADVNC CARE PLAN IN RCRD: CPT | Performed by: INTERNAL MEDICINE

## 2024-07-02 PROCEDURE — 1170F FXNL STATUS ASSESSED: CPT | Performed by: INTERNAL MEDICINE

## 2024-07-02 RX ORDER — LOPERAMIDE HCL 2 MG
2 TABLET ORAL 4 TIMES DAILY PRN
Qty: 30 TABLET | Refills: 3 | Status: SHIPPED | OUTPATIENT
Start: 2024-07-02 | End: 2024-07-12

## 2024-07-02 RX ORDER — IPRATROPIUM BROMIDE 42 UG/1
2 SPRAY, METERED NASAL 4 TIMES DAILY
Qty: 15 ML | Refills: 2 | Status: SHIPPED | OUTPATIENT
Start: 2024-07-02 | End: 2025-07-02

## 2024-07-02 ASSESSMENT — ACTIVITIES OF DAILY LIVING (ADL)
BATHING: INDEPENDENT
TAKING MEDICATION: INDEPENDENT
JUDGMENT_ADEQUATE_SAFELY_COMPLETE_DAILY_ACTIVITIES: YES
PATIENT'S MEMORY ADEQUATE TO SAFELY COMPLETE DAILY ACTIVITIES?: YES
TOILETING: INDEPENDENT
ADEQUATE_TO_COMPLETE_ADL: YES
DOING HOUSEWORK: INDEPENDENT
WALKS IN HOME: INDEPENDENT
MANAGING FINANCES: INDEPENDENT
PREPARING MEALS: INDEPENDENT
USING TRANSPORTATION: INDEPENDENT
USING TELEPHONE: INDEPENDENT
EATING: INDEPENDENT
FEEDING YOURSELF: INDEPENDENT
GROOMING: INDEPENDENT
GROCERY SHOPPING: INDEPENDENT
NEEDS ASSISTANCE WITH FOOD: INDEPENDENT
PILL BOX USED: YES
DRESSING YOURSELF: INDEPENDENT
HEARING - LEFT EAR: FUNCTIONAL
HEARING - RIGHT EAR: FUNCTIONAL
STIL DRIVING: YES

## 2024-07-02 ASSESSMENT — ANXIETY QUESTIONNAIRES
7. FEELING AFRAID AS IF SOMETHING AWFUL MIGHT HAPPEN: NOT AT ALL
4. TROUBLE RELAXING: NOT AT ALL
GAD7 TOTAL SCORE: 0
2. NOT BEING ABLE TO STOP OR CONTROL WORRYING: NOT AT ALL
1. FEELING NERVOUS, ANXIOUS, OR ON EDGE: NOT AT ALL
3. WORRYING TOO MUCH ABOUT DIFFERENT THINGS: NOT AT ALL
6. BECOMING EASILY ANNOYED OR IRRITABLE: NOT AT ALL
IF YOU CHECKED OFF ANY PROBLEMS ON THIS QUESTIONNAIRE, HOW DIFFICULT HAVE THESE PROBLEMS MADE IT FOR YOU TO DO YOUR WORK, TAKE CARE OF THINGS AT HOME, OR GET ALONG WITH OTHER PEOPLE: NOT DIFFICULT AT ALL
5. BEING SO RESTLESS THAT IT IS HARD TO SIT STILL: NOT AT ALL

## 2024-07-02 ASSESSMENT — ENCOUNTER SYMPTOMS
APPETITE CHANGE: 0
UNEXPECTED WEIGHT CHANGE: 0
DIARRHEA: 1
FATIGUE: 1
SHORTNESS OF BREATH: 0
ARTHRALGIAS: 1
FREQUENCY: 1
DEPRESSION: 0
APNEA: 0
BACK PAIN: 1
OCCASIONAL FEELINGS OF UNSTEADINESS: 0
LOSS OF SENSATION IN FEET: 0
ACTIVITY CHANGE: 0

## 2024-07-02 ASSESSMENT — PATIENT HEALTH QUESTIONNAIRE - PHQ9
1. LITTLE INTEREST OR PLEASURE IN DOING THINGS: NOT AT ALL
SUM OF ALL RESPONSES TO PHQ9 QUESTIONS 1 AND 2: 0
10. IF YOU CHECKED OFF ANY PROBLEMS, HOW DIFFICULT HAVE THESE PROBLEMS MADE IT FOR YOU TO DO YOUR WORK, TAKE CARE OF THINGS AT HOME, OR GET ALONG WITH OTHER PEOPLE: SOMEWHAT DIFFICULT
SUM OF ALL RESPONSES TO PHQ9 QUESTIONS 1 AND 2: 2
1. LITTLE INTEREST OR PLEASURE IN DOING THINGS: SEVERAL DAYS
2. FEELING DOWN, DEPRESSED OR HOPELESS: NOT AT ALL
2. FEELING DOWN, DEPRESSED OR HOPELESS: SEVERAL DAYS

## 2024-07-02 ASSESSMENT — PAIN SCALES - GENERAL: PAINLEVEL: 0-NO PAIN

## 2024-07-02 ASSESSMENT — COLUMBIA-SUICIDE SEVERITY RATING SCALE - C-SSRS
1. IN THE PAST MONTH, HAVE YOU WISHED YOU WERE DEAD OR WISHED YOU COULD GO TO SLEEP AND NOT WAKE UP?: NO
1. IN THE PAST MONTH, HAVE YOU WISHED YOU WERE DEAD OR WISHED YOU COULD GO TO SLEEP AND NOT WAKE UP?: NO
2. HAVE YOU ACTUALLY HAD ANY THOUGHTS OF KILLING YOURSELF?: NO
6. HAVE YOU EVER DONE ANYTHING, STARTED TO DO ANYTHING, OR PREPARED TO DO ANYTHING TO END YOUR LIFE?: NO
6. HAVE YOU EVER DONE ANYTHING, STARTED TO DO ANYTHING, OR PREPARED TO DO ANYTHING TO END YOUR LIFE?: NO
2. HAVE YOU ACTUALLY HAD ANY THOUGHTS OF KILLING YOURSELF?: NO

## 2024-07-02 NOTE — PROGRESS NOTES
Subjective   Patient ID: Sandra Brown is a 74 y.o. female who presents for Medicare wellness annual exam.  Has been having episodes of diarrhea.  Patient is in need for blood work.  Followed by GYN, urology.    HPI     This is a 74 year old lady with history of scoliosis, hyperlipidemia, chronic gastritis, status post bilateral cataract removal, s/p colonoscopy 2017, s/p COVID 1/12/2021, osteoporosis with lumbar spine T score -4.5 12/2021.  Patient is presented for evaluation and treatment of the above complaints.  Has low back pain episodes.  Has occasional episodes of diarrhea, using Imodium with relief.     Followed  by Gynecology/ urology.  Patient has increase uterine thickness,  had  Biopsy done, was negative     Patient is followed by rheumatology.  She is off Reclast,  having dental issues.  Interested to resume it.     Review of Systems   Constitutional:  Positive for fatigue. Negative for activity change, appetite change and unexpected weight change.   Respiratory:  Negative for apnea and shortness of breath.    Gastrointestinal:  Positive for diarrhea.   Genitourinary:  Positive for frequency.   Musculoskeletal:  Positive for arthralgias and back pain.       Objective   /62   Pulse 64   Temp 36.1 °C (97 °F) (Temporal)   Resp 16   Ht 1.524 m (5')   Wt 52.2 kg (115 lb)   BMI 22.46 kg/m²     Physical Exam  HENT:      Head: Normocephalic.   Eyes:      Extraocular Movements: Extraocular movements intact.      Pupils: Pupils are equal, round, and reactive to light.   Cardiovascular:      Rate and Rhythm: Regular rhythm.      Heart sounds: Normal heart sounds.   Pulmonary:      Breath sounds: Normal breath sounds.   Abdominal:      Palpations: Abdomen is soft.   Musculoskeletal:      Comments: scoliosis   Skin:     General: Skin is warm.   Neurological:      Mental Status: She is alert. Mental status is at baseline.   Psychiatric:         Mood and Affect: Mood normal.         Assessment/Plan    Problem List Items Addressed This Visit             ICD-10-CM    Diarrhea - Primary R19.7    Relevant Medications    loperamide (Imodium A-D) 2 mg tablet    Nasal congestion R09.81    Relevant Medications    ipratropium (Atrovent) 42 mcg (0.06 %) nasal spray     Other Visit Diagnoses         Codes    Routine general medical examination at health care facility     Z00.00    Relevant Orders    1 Year Follow Up In Primary Care - Wellness Exam              - S/p EGD 6/15/2023.  No significant abnormalities.      - Pelvic discomfort.  Followed by Dr Martinez.  Had pelvic ultrasound  done, has increased endometrium, up to 9 mm, s/p   Biopsy, negative.     - Bilateral axillary discomfort.  Mammography 1/4/2024.  No masses, tenderness.     R sided  back skin lesion.  See dermatology .     - R hip pain.   Resolved.  Take Tylenol as needed.     - Hyperlipidemia.  Take simvastatin as directed.  Keep Mediterranean diet, exercise.     - H of acute sinusitis.  Had completed doxycycline, benzonatate.  Improved.     - Osteoporosis.  Last DEXA scan was done 12/21/2021, repeat.  T score at her LS spine -4.5.  Evaluated by rheumatology, had 2 injections of Reclast.  Off now, due to dental work.  Calcium supplements, vitamin D.  Exercises much as you can.     - Headaches episodes.  Discussed with patient about CT scan results.  Started on physical therapy.  Take amitriptyline daily.  Improved.     - Frequent herpes outbreaks.  Continue with herpes prophylaxis.     - H of COVID -19 illness 1/2021.  Patient has had vaccination in August.  Slowly recovered.     - Hypertension.  Stable.  Continue to take current therapy.   Exercise.  Avoid salt.     - Scoliosis.  Continue with daily exercise.     - Health maintenance.  GYN exam Dr. Sierra.  Mammography is up to date.  Colonoscopy 2017, done at [place].  Vaccinations.  Medicare wellness annual exam is today.     - Normal Adult weight with BMI 20.67  Diet, exercise.  Keep ideal BMI less  than 25.

## 2024-07-09 ENCOUNTER — APPOINTMENT (OUTPATIENT)
Dept: PRIMARY CARE | Facility: CLINIC | Age: 74
End: 2024-07-09
Payer: MEDICARE

## 2024-07-09 DIAGNOSIS — E53.8 VITAMIN B12 DEFICIENCY: ICD-10-CM

## 2024-07-09 DIAGNOSIS — I10 PRIMARY HYPERTENSION: Primary | ICD-10-CM

## 2024-07-09 DIAGNOSIS — E03.9 HYPOTHYROIDISM, UNSPECIFIED TYPE: ICD-10-CM

## 2024-07-09 DIAGNOSIS — M19.90 ARTHRITIS: ICD-10-CM

## 2024-07-09 DIAGNOSIS — R73.9 HYPERGLYCEMIA: ICD-10-CM

## 2024-07-09 DIAGNOSIS — D50.9 IRON DEFICIENCY ANEMIA, UNSPECIFIED IRON DEFICIENCY ANEMIA TYPE: ICD-10-CM

## 2024-07-09 DIAGNOSIS — R53.81 MALAISE: ICD-10-CM

## 2024-07-09 DIAGNOSIS — E55.9 VITAMIN D DEFICIENCY: ICD-10-CM

## 2024-07-09 DIAGNOSIS — E78.2 HYPERLIPEMIA, MIXED: ICD-10-CM

## 2024-07-09 DIAGNOSIS — E78.5 HYPERLIPIDEMIA, UNSPECIFIED HYPERLIPIDEMIA TYPE: ICD-10-CM

## 2024-07-09 DIAGNOSIS — E55.9 MILD VITAMIN D DEFICIENCY: ICD-10-CM

## 2024-07-09 LAB
CCP IGG SERPL-ACNC: <1 U/ML
CENTROMERE B AB SER-ACNC: <0.2 AI
CHROMATIN AB SERPL-ACNC: <0.2 AI
CRP SERPL-MCNC: 0.15 MG/DL
DSDNA AB SER-ACNC: <1 IU/ML
ENA JO1 AB SER QL IA: <0.2 AI
ENA RNP AB SER IA-ACNC: 0.8 AI
ENA SCL70 AB SER QL IA: <0.2 AI
ENA SM AB SER IA-ACNC: <0.2 AI
ENA SM+RNP AB SER QL IA: <0.2 AI
ENA SS-A AB SER IA-ACNC: <0.2 AI
ENA SS-B AB SER IA-ACNC: <0.2 AI
ERYTHROCYTE [SEDIMENTATION RATE] IN BLOOD BY WESTERGREN METHOD: 24 MM/H (ref 0–30)
FERRITIN SERPL-MCNC: 130 NG/ML (ref 8–150)
IRON SATN MFR SERPL: 29 % (ref 25–45)
IRON SERPL-MCNC: 95 UG/DL (ref 35–150)
RHEUMATOID FACT SER NEPH-ACNC: <10 IU/ML (ref 0–15)
RIBOSOMAL P AB SER-ACNC: <0.2 AI
TIBC SERPL-MCNC: 332 UG/DL (ref 240–445)
UIBC SERPL-MCNC: 237 UG/DL (ref 110–370)
URATE SERPL-MCNC: 3.5 MG/DL (ref 2.3–6.7)

## 2024-07-09 PROCEDURE — 85652 RBC SED RATE AUTOMATED: CPT

## 2024-07-09 PROCEDURE — 36415 COLL VENOUS BLD VENIPUNCTURE: CPT

## 2024-07-09 PROCEDURE — 83036 HEMOGLOBIN GLYCOSYLATED A1C: CPT | Performed by: INTERNAL MEDICINE

## 2024-07-09 PROCEDURE — 86140 C-REACTIVE PROTEIN: CPT

## 2024-07-09 PROCEDURE — 85025 COMPLETE CBC W/AUTO DIFF WBC: CPT | Performed by: INTERNAL MEDICINE

## 2024-07-09 PROCEDURE — 86225 DNA ANTIBODY NATIVE: CPT

## 2024-07-09 PROCEDURE — 86200 CCP ANTIBODY: CPT

## 2024-07-09 PROCEDURE — 83550 IRON BINDING TEST: CPT

## 2024-07-09 PROCEDURE — 86235 NUCLEAR ANTIGEN ANTIBODY: CPT

## 2024-07-09 PROCEDURE — 82607 VITAMIN B-12: CPT | Performed by: INTERNAL MEDICINE

## 2024-07-09 PROCEDURE — 80053 COMPREHEN METABOLIC PANEL: CPT | Performed by: INTERNAL MEDICINE

## 2024-07-09 PROCEDURE — 82728 ASSAY OF FERRITIN: CPT

## 2024-07-09 PROCEDURE — 84550 ASSAY OF BLOOD/URIC ACID: CPT

## 2024-07-09 PROCEDURE — 80061 LIPID PANEL: CPT | Performed by: INTERNAL MEDICINE

## 2024-07-09 PROCEDURE — 82306 VITAMIN D 25 HYDROXY: CPT | Performed by: INTERNAL MEDICINE

## 2024-07-09 PROCEDURE — 86431 RHEUMATOID FACTOR QUANT: CPT

## 2024-07-09 PROCEDURE — 84443 ASSAY THYROID STIM HORMONE: CPT | Performed by: INTERNAL MEDICINE

## 2024-07-09 PROCEDURE — 83540 ASSAY OF IRON: CPT

## 2024-07-09 PROCEDURE — 86038 ANTINUCLEAR ANTIBODIES: CPT

## 2024-07-10 LAB — HEMOGLOBIN A1C/HEMOGLOBIN TOTAL IN BLOOD EXTERNAL: 5.5 %

## 2024-07-11 LAB
ANA PATTERN: ABNORMAL
ANA SER QL HEP2 SUBST: POSITIVE
ANA TITR SER IF: ABNORMAL {TITER}
CENTROMERE B AB SER-ACNC: <0.2 AI
CHROMATIN AB SERPL-ACNC: <0.2 AI
DSDNA AB SER-ACNC: <1 IU/ML
ENA JO1 AB SER QL IA: <0.2 AI
ENA RNP AB SER IA-ACNC: 0.9 AI
ENA SCL70 AB SER QL IA: <0.2 AI
ENA SM AB SER IA-ACNC: <0.2 AI
ENA SM+RNP AB SER QL IA: <0.2 AI
ENA SS-A AB SER IA-ACNC: <0.2 AI
ENA SS-B AB SER IA-ACNC: <0.2 AI
RIBOSOMAL P AB SER-ACNC: <0.2 AI

## 2024-07-15 ENCOUNTER — APPOINTMENT (OUTPATIENT)
Dept: OBSTETRICS AND GYNECOLOGY | Facility: CLINIC | Age: 74
End: 2024-07-15
Payer: MEDICARE

## 2024-07-18 ENCOUNTER — APPOINTMENT (OUTPATIENT)
Dept: OBSTETRICS AND GYNECOLOGY | Facility: CLINIC | Age: 74
End: 2024-07-18
Payer: MEDICARE

## 2024-07-26 ENCOUNTER — APPOINTMENT (OUTPATIENT)
Dept: PRIMARY CARE | Facility: CLINIC | Age: 74
End: 2024-07-26
Payer: MEDICARE

## 2024-07-26 VITALS
SYSTOLIC BLOOD PRESSURE: 122 MMHG | HEIGHT: 60 IN | WEIGHT: 114 LBS | BODY MASS INDEX: 22.38 KG/M2 | HEART RATE: 62 BPM | RESPIRATION RATE: 16 BRPM | TEMPERATURE: 98 F | DIASTOLIC BLOOD PRESSURE: 62 MMHG

## 2024-07-26 DIAGNOSIS — R10.9 ABDOMINAL PAIN, UNSPECIFIED ABDOMINAL LOCATION: Primary | ICD-10-CM

## 2024-07-26 DIAGNOSIS — E53.8 VITAMIN B12 DEFICIENCY: ICD-10-CM

## 2024-07-26 DIAGNOSIS — E55.9 MILD VITAMIN D DEFICIENCY: ICD-10-CM

## 2024-07-26 DIAGNOSIS — E05.90 HYPERTHYROIDISM: ICD-10-CM

## 2024-07-26 LAB
T3FREE SERPL-MCNC: 3.1 PG/ML (ref 2.3–4.2)
T4 FREE SERPL-MCNC: 1.44 NG/DL (ref 0.78–1.48)
THYROPEROXIDASE AB SERPL-ACNC: 32 IU/ML

## 2024-07-26 PROCEDURE — 36415 COLL VENOUS BLD VENIPUNCTURE: CPT

## 2024-07-26 PROCEDURE — 86376 MICROSOMAL ANTIBODY EACH: CPT

## 2024-07-26 PROCEDURE — 84481 FREE ASSAY (FT-3): CPT

## 2024-07-26 PROCEDURE — 86800 THYROGLOBULIN ANTIBODY: CPT

## 2024-07-26 PROCEDURE — 84439 ASSAY OF FREE THYROXINE: CPT

## 2024-07-26 RX ORDER — LANOLIN ALCOHOL/MO/W.PET/CERES
1000 CREAM (GRAM) TOPICAL DAILY
Qty: 100 TABLET | Refills: 3 | Status: SHIPPED | OUTPATIENT
Start: 2024-07-26 | End: 2024-11-03

## 2024-07-26 RX ORDER — CYANOCOBALAMIN 1000 UG/ML
1000 INJECTION, SOLUTION INTRAMUSCULAR; SUBCUTANEOUS ONCE
Status: COMPLETED | OUTPATIENT
Start: 2024-07-26 | End: 2024-07-26

## 2024-07-26 RX ORDER — ACETAMINOPHEN 500 MG
5000 TABLET ORAL DAILY
Qty: 90 TABLET | Refills: 2 | Status: SHIPPED | OUTPATIENT
Start: 2024-07-26 | End: 2024-10-24

## 2024-07-26 ASSESSMENT — ENCOUNTER SYMPTOMS
ACTIVITY CHANGE: 1
CONSTIPATION: 0
APPETITE CHANGE: 1
ARTHRALGIAS: 0
BACK PAIN: 1
ABDOMINAL PAIN: 1

## 2024-07-26 NOTE — PROGRESS NOTES
Subjective   Patient ID: Sandra Brown is a 74 y.o. female who presents for  follow up of abnormal TSH.  Complaining of RUQ abdominal discomfort.  Patient is here for Vit B 12 administration.    HPI     This is a 74 year old lady with history of scoliosis, hyperlipidemia, chronic gastritis, status post bilateral cataract removal, s/p colonoscopy 2017, s/p COVID 1/12/2021, osteoporosis with lumbar spine T score -4.5 12/2021.  Patient is presented for evaluation and treatment of the above complaints.  Has had episodes of RUQ pain, interested to have Abdominal ultrasound done.  She is here for Vit B12 administration.  Has low back pain episodes.  Has occasional episodes of diarrhea, using Imodium with relief.     Followed  by Gynecology/ urology.  Patient has increase uterine thickness,  had  Biopsy done, was negative     Patient is followed by rheumatology.  She is off Reclast,  having dental issues.  Interested to resume it.     Review of Systems   Constitutional:  Positive for activity change and appetite change.   Gastrointestinal:  Positive for abdominal pain. Negative for constipation.   Musculoskeletal:  Positive for back pain. Negative for arthralgias.       Objective   /62   Pulse 62   Temp 36.7 °C (98 °F) (Temporal)   Resp 16   Ht 1.524 m (5')   Wt 51.7 kg (114 lb)   BMI 22.26 kg/m²     Physical Exam  Constitutional:       Appearance: Normal appearance.   HENT:      Head: Normocephalic.      Nose: Nose normal.   Cardiovascular:      Rate and Rhythm: Normal rate and regular rhythm.      Heart sounds: Normal heart sounds.   Pulmonary:      Breath sounds: Normal breath sounds.   Abdominal:      Palpations: Abdomen is soft.   Musculoskeletal:      Comments: Has scoliosis   Neurological:      Mental Status: She is alert. Mental status is at baseline.         Assessment/Plan   Problem List Items Addressed This Visit             ICD-10-CM    Mild vitamin D deficiency E55.9    Relevant Medications     cholecalciferol (Vitamin D3) 5,000 Units tablet    Vitamin B12 deficiency E53.8    Relevant Medications    cyanocobalamin (Vitamin B-12) injection 1,000 mcg (Completed)    cyanocobalamin (Vitamin B-12) 1,000 mcg tablet     Other Visit Diagnoses         Codes    Abdominal pain, unspecified abdominal location    -  Primary R10.9    Relevant Orders    US abdomen complete    Hyperthyroidism     E05.90    Relevant Orders    Anti-Thyroglobulin Antibody    Thyroid Peroxidase (TPO) Antibody    Thyroxine, Free    Triiodothyronine, Free    Thyroid Stimulating Hormone    US thyroid          Vit B12 deficiency  with level of 66.  Vit B12 today IM.  Take 1000 mcg daily.     - S/p EGD 6/15/2023.  No significant abnormalities.      - Pelvic discomfort.  Followed by Dr Martinez.  Had pelvic ultrasound  done, has increased endometrium, up to 9 mm, s/p   Biopsy, negative.     - Bilateral axillary discomfort.  Mammography 1/4/2024.  No masses, tenderness.     R sided  back skin lesion.  See dermatology .     - R hip pain.   Resolved.  Take Tylenol as needed.     - Hyperlipidemia.  Seen by DR Carl 5/2024, started on Rosuvastatin.  Keep Mediterranean diet, exercise.     - H of acute sinusitis.  Had completed doxycycline, benzonatate.  Improved.     - Osteoporosis.  Last DEXA scan was done 12/21/2021, repeat.  T score at her LS spine -4.5.  Evaluated by rheumatology, had 2 injections of Reclast.  Off now, due to dental work.  Calcium supplements, vitamin D.  Exercises much as you can.     - Headaches episodes.  Discussed with patient about CT scan results.  Started on physical therapy.  Take amitriptyline daily.  Improved.     - Frequent herpes outbreaks.  Continue with herpes prophylaxis.     - H of COVID -19 illness 1/2021.  Patient has had vaccination in August.  Slowly recovered.     - Hypertension.  Stable.  Continue to take current therapy.   Exercise.  Avoid salt.     - Scoliosis.  Continue with daily exercise.     - Health  maintenance.  GYN exam Dr. Sierra.  Mammography  1/2024.  Colonoscopy 2017, done at [facility] [place].  Vaccinations.  Medicare wellness annual exam is  up to date.     - Normal Adult weight with BMI 22.26  Diet, exercise.  Keep ideal BMI less than 25.

## 2024-07-29 DIAGNOSIS — Z12.11 ENCOUNTER FOR SCREENING FOR MALIGNANT NEOPLASM OF COLON: Primary | ICD-10-CM

## 2024-07-29 LAB — THYROGLOB AB SERPL-ACNC: 201.7 IU/ML (ref 0–4)

## 2024-07-31 NOTE — PROGRESS NOTES
Urogynecology  Provider:  Vale Martinez MD  [phone]              ASSESSMENT AND PLAN:   74 y.o. female with hx of hematuria and constipation. Comorbidities include: HTN, HLD, GERD, and osteoporosis.              Diagnoses:   #1 Hematuria   #2 Constipation      Plan:   1. AMH   - She had a cysto on 1/18/2024 that was normal and she had a CT urogram on 12/2/23 that showed simple renal cysts only.    - Urine shows small blood, which is her baseline.     2. Pain from constipation   - We discussed increasing fluid intake particularly with hot beverages and caffeine to stimulate the bowels. She can also add Miralax PRN as well with the goal of having soft, formed stool.       Follow-up in 1 year with Dr. Martinez.     Scribe Attestation:   I, Umu Price, am scribing for virtually, and in the presence of Vale Martinez MD on 8/1/24 at 12:02 PM.     Agree with above. I Dr. Martinez, personally performed the services described in the documentation which was scribed virtually and confirm it is both complete and accurate.  Vale Martinez MD        Problem List Items Addressed This Visit    None          I spent a total of eConsult Time: 25 minutes in face to face and non face to face time.        Vale Martniez MD        HISTORY OF PRESENT ILLNESS:     74 y.o. here for follow up on hx of hematuria.   Last visit 1/2024. Had a cysto 1/2024 for hematuria which was normal and CT urogram showed simple renal cysts only.         Bowel Symptoms:  - She's had some abdominal discomfort for a few days.   - Endorses constipation.   - Passing small amounts of stool.        Past Medical History:      No past medical history on file.       Past Surgical History:       Past Surgical History:   Procedure Laterality Date    OTHER SURGICAL HISTORY  01/03/2020    Cataract surgery         Medications:       Prior to Admission medications    Medication Sig Start Date End Date Taking? Authorizing Provider   acyclovir  (Zovirax) 400 mg tablet Take 1 tablet (400 mg) by mouth once daily. 12/10/21   Historical Provider, MD   amitriptyline (Elavil) 10 mg tablet Take 1 tablet (10 mg) by mouth once daily at bedtime. 10/10/23   Alexia Perry MD   cholecalciferol (Vitamin D3) 5,000 Units tablet Take 1 tablet (5,000 Units) by mouth once daily. 7/26/24 10/24/24  Alexia Perry MD   cyanocobalamin (Vitamin B-12) 1,000 mcg tablet Take 1 tablet (1,000 mcg) by mouth once daily. 7/26/24 11/3/24  Alexia Perry MD   estradiol (Estrace) 0.01 % (0.1 mg/gram) vaginal cream APPLY ONE GRAM TO VAGINA TWO NIGHTS PER WEEK 6/5/23   Historical Provider, MD   ipratropium (Atrovent) 42 mcg (0.06 %) nasal spray Administer 2 sprays into each nostril 4 times a day. 7/2/24 7/2/25  Alexia Perry MD   omeprazole (PriLOSEC) 40 mg DR capsule Take 1 capsule (40 mg) by mouth once daily. Swallow whole. Do not crush or chew. 12/18/23 7/26/24  Alexia Perry MD   simvastatin (Zocor) 40 mg tablet TAKE ONE TABLET BY MOUTH DAILY 1/11/24   Alexia Perry MD   trospium (Sanctura) 20 mg tablet TAKE 0.5 TABLETS (10 MG) BY MOUTH 2 TIMES A DAY AS NEEDED (1/2 TO 1 TABLET AS NEEDED FOR FREQUENCY).  Patient not taking: Reported on 7/26/2024 11/7/23   Vale Martinez MD   zoledronic acid/mannitol-water (RECLAST IV) Infuse 5 mg into a venous catheter yearly.    Historical Provider, MD ZHENG  Review of Systems     Blood, Urine   Date Value Ref Range Status   11/02/2023 SMALL (1+) (A) NEGATIVE Final     POC Blood, Urine   Date Value Ref Range Status   01/18/2024 MODERATE (2+) (A) NEGATIVE Final     Poc Nitrite, Urine   Date Value Ref Range Status   01/18/2024 NEGATIVE NEGATIVE Final     Urobilinogen, Urine   Date Value Ref Range Status   11/02/2023 <2.0 <2.0 mg/dL Final     POC Urobilinogen, Urine   Date Value Ref Range Status   01/18/2024 0.2 0.2, 1.0 EU/DL Final     POC Leukocytes, Urine   Date Value Ref Range Status   01/18/2024 NEGATIVE NEGATIVE Final  "        PHYSICAL EXAM:      There were no vitals taken for this visit.     No LMP recorded. Patient is postmenopausal.      Declines chaperone for physical exam.      Well developed, well nourished, in no apparent distress.   Neurologic/Psychiatric:  Awake, Alert and Oriented times 3.  Affect normal.     GENITAL/URINARY:       External Genitalia:  The patient has normal appearing external genitalia, normal skenes and bartholins glands, and a normal hair distribution.  Her vulva is without lesions, erythema or discharge.  It is non-tender with appropriate sensation.     Urethral Meatus:  Size normal, Location normal, Lesions absent, Prolapse absent,      Urethra:  Fullness absent, Masses absent,      Bladder:  Fullness absent, Masses absent, Tenderness absent,      Vagina:  General appearance normal, Discharge absent, Lesions absent, Moderately atrophic.     Cervix: Normal, no discharge.   Uterus:  mobile, small  Adnexa:  normal bilateral adnexa     Anus/Perineum:  Lesions absent and Masses absent defer exam  Normal Perineum         No significant prolapse.         Data and DIAGNOSTIC STUDIES REVIEWED   No results found.   Lab Results   Component Value Date    URINECULTURE **Culture Comments - See Below 08/17/2023      Lab Results   Component Value Date    CREATININE 0.75 11/30/2023   No results found for: \"WBC\", \"HGB\", \"HCT\", \"MCV\", \"PLT\"       Vale Martinez MD        "

## 2024-08-01 ENCOUNTER — OFFICE VISIT (OUTPATIENT)
Dept: OBSTETRICS AND GYNECOLOGY | Facility: CLINIC | Age: 74
End: 2024-08-01
Payer: MEDICARE

## 2024-08-01 VITALS
SYSTOLIC BLOOD PRESSURE: 125 MMHG | BODY MASS INDEX: 22.78 KG/M2 | WEIGHT: 116 LBS | HEIGHT: 60 IN | DIASTOLIC BLOOD PRESSURE: 86 MMHG | HEART RATE: 65 BPM

## 2024-08-01 DIAGNOSIS — R31.21 ASYMPTOMATIC MICROSCOPIC HEMATURIA: Primary | ICD-10-CM

## 2024-08-01 LAB — HOLD SPECIMEN: NORMAL

## 2024-08-01 PROCEDURE — 1157F ADVNC CARE PLAN IN RCRD: CPT | Performed by: OBSTETRICS & GYNECOLOGY

## 2024-08-01 PROCEDURE — 1126F AMNT PAIN NOTED NONE PRSNT: CPT | Performed by: OBSTETRICS & GYNECOLOGY

## 2024-08-01 PROCEDURE — 3074F SYST BP LT 130 MM HG: CPT | Performed by: OBSTETRICS & GYNECOLOGY

## 2024-08-01 PROCEDURE — 1159F MED LIST DOCD IN RCRD: CPT | Performed by: OBSTETRICS & GYNECOLOGY

## 2024-08-01 PROCEDURE — 3008F BODY MASS INDEX DOCD: CPT | Performed by: OBSTETRICS & GYNECOLOGY

## 2024-08-01 PROCEDURE — 99213 OFFICE O/P EST LOW 20 MIN: CPT | Performed by: OBSTETRICS & GYNECOLOGY

## 2024-08-01 PROCEDURE — 81003 URINALYSIS AUTO W/O SCOPE: CPT | Performed by: OBSTETRICS & GYNECOLOGY

## 2024-08-01 PROCEDURE — 3079F DIAST BP 80-89 MM HG: CPT | Performed by: OBSTETRICS & GYNECOLOGY

## 2024-08-01 PROCEDURE — 1036F TOBACCO NON-USER: CPT | Performed by: OBSTETRICS & GYNECOLOGY

## 2024-08-01 ASSESSMENT — PAIN SCALES - GENERAL: PAINLEVEL: 0-NO PAIN

## 2024-08-02 ENCOUNTER — HOSPITAL ENCOUNTER (OUTPATIENT)
Dept: RADIOLOGY | Facility: CLINIC | Age: 74
Discharge: HOME | End: 2024-08-02
Payer: MEDICARE

## 2024-08-02 ENCOUNTER — APPOINTMENT (OUTPATIENT)
Dept: RADIOLOGY | Facility: CLINIC | Age: 74
End: 2024-08-02
Payer: MEDICARE

## 2024-08-02 DIAGNOSIS — E05.90 HYPERTHYROIDISM: ICD-10-CM

## 2024-08-02 LAB
APPEARANCE UR: CLEAR
BILIRUB UR STRIP.AUTO-MCNC: NEGATIVE MG/DL
COLOR UR: COLORLESS
GLUCOSE UR STRIP.AUTO-MCNC: NORMAL MG/DL
KETONES UR STRIP.AUTO-MCNC: NEGATIVE MG/DL
LEUKOCYTE ESTERASE UR QL STRIP.AUTO: NEGATIVE
NITRITE UR QL STRIP.AUTO: NEGATIVE
PH UR STRIP.AUTO: 5.5 [PH]
PROT UR STRIP.AUTO-MCNC: NEGATIVE MG/DL
RBC # UR STRIP.AUTO: NEGATIVE /UL
SP GR UR STRIP.AUTO: 1.01
UROBILINOGEN UR STRIP.AUTO-MCNC: NORMAL MG/DL

## 2024-08-02 PROCEDURE — 76536 US EXAM OF HEAD AND NECK: CPT

## 2024-08-15 LAB — NONINV COLON CA DNA+OCC BLD SCRN STL QL: NEGATIVE

## 2024-08-27 ENCOUNTER — APPOINTMENT (OUTPATIENT)
Dept: PRIMARY CARE | Facility: CLINIC | Age: 74
End: 2024-08-27
Payer: MEDICARE

## 2024-08-27 DIAGNOSIS — E53.8 VITAMIN B12 DEFICIENCY: Primary | ICD-10-CM

## 2024-08-27 PROCEDURE — 96372 THER/PROPH/DIAG INJ SC/IM: CPT | Performed by: INTERNAL MEDICINE

## 2024-08-27 RX ORDER — CYANOCOBALAMIN 1000 UG/ML
1000 INJECTION, SOLUTION INTRAMUSCULAR; SUBCUTANEOUS ONCE
Status: COMPLETED | OUTPATIENT
Start: 2024-08-27 | End: 2024-08-27

## 2024-10-01 ENCOUNTER — APPOINTMENT (OUTPATIENT)
Dept: PRIMARY CARE | Facility: CLINIC | Age: 74
End: 2024-10-01
Payer: MEDICARE

## 2024-10-01 DIAGNOSIS — E53.8 VITAMIN B12 DEFICIENCY: Primary | ICD-10-CM

## 2024-10-01 PROCEDURE — 96372 THER/PROPH/DIAG INJ SC/IM: CPT | Performed by: INTERNAL MEDICINE

## 2024-10-01 RX ORDER — CYANOCOBALAMIN 1000 UG/ML
1000 INJECTION, SOLUTION INTRAMUSCULAR; SUBCUTANEOUS ONCE
Status: COMPLETED | OUTPATIENT
Start: 2024-10-01 | End: 2024-10-01

## 2024-10-10 DIAGNOSIS — R51.9 INTRACTABLE HEADACHE, UNSPECIFIED CHRONICITY PATTERN, UNSPECIFIED HEADACHE TYPE: ICD-10-CM

## 2024-10-11 RX ORDER — AMITRIPTYLINE HYDROCHLORIDE 10 MG/1
10 TABLET, FILM COATED ORAL NIGHTLY
Qty: 90 TABLET | Refills: 3 | Status: SHIPPED | OUTPATIENT
Start: 2024-10-11

## 2024-11-05 ENCOUNTER — APPOINTMENT (OUTPATIENT)
Dept: PRIMARY CARE | Facility: CLINIC | Age: 74
End: 2024-11-05
Payer: MEDICARE

## 2024-12-10 ENCOUNTER — APPOINTMENT (OUTPATIENT)
Dept: PRIMARY CARE | Facility: CLINIC | Age: 74
End: 2024-12-10
Payer: MEDICARE

## 2024-12-24 ENCOUNTER — HOSPITAL ENCOUNTER (OUTPATIENT)
Dept: RADIOLOGY | Facility: CLINIC | Age: 74
Discharge: HOME | End: 2024-12-24
Payer: MEDICARE

## 2024-12-24 ENCOUNTER — APPOINTMENT (OUTPATIENT)
Dept: PRIMARY CARE | Facility: CLINIC | Age: 74
End: 2024-12-24
Payer: MEDICARE

## 2024-12-24 DIAGNOSIS — D64.9 ANEMIA, UNSPECIFIED TYPE: ICD-10-CM

## 2024-12-24 DIAGNOSIS — K21.9 GASTROESOPHAGEAL REFLUX DISEASE, UNSPECIFIED WHETHER ESOPHAGITIS PRESENT: ICD-10-CM

## 2024-12-24 DIAGNOSIS — E05.90 HYPERTHYROIDISM: ICD-10-CM

## 2024-12-24 DIAGNOSIS — E55.9 MILD VITAMIN D DEFICIENCY: ICD-10-CM

## 2024-12-24 DIAGNOSIS — R73.9 HYPERGLYCEMIA: ICD-10-CM

## 2024-12-24 DIAGNOSIS — R06.02 SOB (SHORTNESS OF BREATH): ICD-10-CM

## 2024-12-24 DIAGNOSIS — E55.9 VITAMIN D DEFICIENCY: ICD-10-CM

## 2024-12-24 DIAGNOSIS — E78.2 HYPERLIPEMIA, MIXED: ICD-10-CM

## 2024-12-24 DIAGNOSIS — R51.9 INTRACTABLE HEADACHE, UNSPECIFIED CHRONICITY PATTERN, UNSPECIFIED HEADACHE TYPE: ICD-10-CM

## 2024-12-24 DIAGNOSIS — R10.9 ABDOMINAL PAIN, UNSPECIFIED ABDOMINAL LOCATION: ICD-10-CM

## 2024-12-24 DIAGNOSIS — R06.02 SOB (SHORTNESS OF BREATH): Primary | ICD-10-CM

## 2024-12-24 DIAGNOSIS — E03.9 HYPOTHYROIDISM, UNSPECIFIED TYPE: ICD-10-CM

## 2024-12-24 DIAGNOSIS — R53.81 MALAISE: ICD-10-CM

## 2024-12-24 DIAGNOSIS — E53.8 VITAMIN B12 DEFICIENCY: ICD-10-CM

## 2024-12-24 DIAGNOSIS — E78.5 HYPERLIPIDEMIA, UNSPECIFIED HYPERLIPIDEMIA TYPE: ICD-10-CM

## 2024-12-24 LAB
BASOPHILS # BLD AUTO: 0.03 X10*3/UL (ref 0.1–1.6)
BASOPHILS NFR BLD AUTO: 0.61 % (ref 0–0.3)
EOSINOPHIL # BLD AUTO: 0.18 X10*3/UL (ref 0.04–0.5)
EOSINOPHIL NFR BLD AUTO: 3.35 % (ref 0.7–7)
ERYTHROCYTE [DISTWIDTH] IN BLOOD BY AUTOMATED COUNT: 14.9 % (ref 11.5–14.5)
FERRITIN SERPL-MCNC: 103 NG/ML (ref 8–150)
HBA1C MFR BLD: 5.7 %
HCT VFR BLD AUTO: 38.3 % (ref 36.6–46.6)
HGB BLD-MCNC: 12.75 G/DL (ref 12–15.4)
IRON SATN MFR SERPL: 25 % (ref 25–45)
IRON SERPL-MCNC: 84 UG/DL (ref 35–150)
LYMPHOCYTES # BLD AUTO: 1.63 X10*3/UL (ref 0–6)
LYMPHOCYTES NFR BLD AUTO: 31.15 % (ref 20.5–51.1)
MCH RBC QN AUTO: 29 PG (ref 26–32)
MCHC RBC AUTO-ENTMCNC: 33.3 G/DL (ref 31–38)
MCV RBC AUTO: 87.3 FL (ref 80–96)
MONOCYTES # BLD AUTO: 0.57 X10*3/UL (ref 1.6–24.9)
MONOCYTES NFR BLD AUTO: 10.86 % (ref 1.7–9.3)
NEUTROPHILS # BLD AUTO: 2.83 X10*3/UL (ref 1.4–6.5)
NEUTROPHILS NFR BLD AUTO: 54.03 % (ref 42.2–75.2)
PLATELET # BLD AUTO: 296.6 X10*3/UL (ref 150–450)
PMV BLD AUTO: 8.86 FL (ref 7.8–11)
RBC # BLD AUTO: 4.39 X10*6/UL (ref 3.9–5.3)
TIBC SERPL-MCNC: 341 UG/DL (ref 240–445)
UIBC SERPL-MCNC: 257 UG/DL (ref 110–370)
WBC # BLD AUTO: 5.23 X10*3/UL (ref 4.5–10.5)

## 2024-12-24 PROCEDURE — 1123F ACP DISCUSS/DSCN MKR DOCD: CPT | Performed by: INTERNAL MEDICINE

## 2024-12-24 PROCEDURE — 82607 VITAMIN B-12: CPT | Performed by: INTERNAL MEDICINE

## 2024-12-24 PROCEDURE — 80053 COMPREHEN METABOLIC PANEL: CPT | Performed by: INTERNAL MEDICINE

## 2024-12-24 PROCEDURE — 3078F DIAST BP <80 MM HG: CPT | Performed by: INTERNAL MEDICINE

## 2024-12-24 PROCEDURE — 1158F ADVNC CARE PLAN TLK DOCD: CPT | Performed by: INTERNAL MEDICINE

## 2024-12-24 PROCEDURE — 96372 THER/PROPH/DIAG INJ SC/IM: CPT | Performed by: INTERNAL MEDICINE

## 2024-12-24 PROCEDURE — 80061 LIPID PANEL: CPT | Performed by: INTERNAL MEDICINE

## 2024-12-24 PROCEDURE — 1126F AMNT PAIN NOTED NONE PRSNT: CPT | Performed by: INTERNAL MEDICINE

## 2024-12-24 PROCEDURE — 3074F SYST BP LT 130 MM HG: CPT | Performed by: INTERNAL MEDICINE

## 2024-12-24 PROCEDURE — 83550 IRON BINDING TEST: CPT

## 2024-12-24 PROCEDURE — 84443 ASSAY THYROID STIM HORMONE: CPT | Performed by: INTERNAL MEDICINE

## 2024-12-24 PROCEDURE — 1160F RVW MEDS BY RX/DR IN RCRD: CPT | Performed by: INTERNAL MEDICINE

## 2024-12-24 PROCEDURE — 71046 X-RAY EXAM CHEST 2 VIEWS: CPT | Performed by: RADIOLOGY

## 2024-12-24 PROCEDURE — 1159F MED LIST DOCD IN RCRD: CPT | Performed by: INTERNAL MEDICINE

## 2024-12-24 PROCEDURE — 1157F ADVNC CARE PLAN IN RCRD: CPT | Performed by: INTERNAL MEDICINE

## 2024-12-24 PROCEDURE — 93000 ELECTROCARDIOGRAM COMPLETE: CPT | Performed by: INTERNAL MEDICINE

## 2024-12-24 PROCEDURE — 82728 ASSAY OF FERRITIN: CPT

## 2024-12-24 PROCEDURE — G2211 COMPLEX E/M VISIT ADD ON: HCPCS | Performed by: INTERNAL MEDICINE

## 2024-12-24 PROCEDURE — 99213 OFFICE O/P EST LOW 20 MIN: CPT | Performed by: INTERNAL MEDICINE

## 2024-12-24 PROCEDURE — 83036 HEMOGLOBIN GLYCOSYLATED A1C: CPT | Performed by: INTERNAL MEDICINE

## 2024-12-24 PROCEDURE — 85025 COMPLETE CBC W/AUTO DIFF WBC: CPT | Performed by: INTERNAL MEDICINE

## 2024-12-24 PROCEDURE — 82306 VITAMIN D 25 HYDROXY: CPT | Performed by: INTERNAL MEDICINE

## 2024-12-24 PROCEDURE — 83540 ASSAY OF IRON: CPT

## 2024-12-24 PROCEDURE — 71046 X-RAY EXAM CHEST 2 VIEWS: CPT

## 2024-12-24 RX ORDER — OMEPRAZOLE 40 MG/1
40 CAPSULE, DELAYED RELEASE ORAL DAILY
Qty: 90 CAPSULE | Refills: 3 | Status: SHIPPED | OUTPATIENT
Start: 2024-12-24 | End: 2025-03-24

## 2024-12-24 RX ORDER — ROSUVASTATIN CALCIUM 40 MG/1
40 TABLET, COATED ORAL DAILY
Qty: 100 TABLET | Refills: 3 | Status: SHIPPED | OUTPATIENT
Start: 2024-12-24 | End: 2026-01-28

## 2024-12-24 RX ORDER — CYANOCOBALAMIN 1000 UG/ML
1000 INJECTION, SOLUTION INTRAMUSCULAR; SUBCUTANEOUS ONCE
Status: COMPLETED | OUTPATIENT
Start: 2024-12-24 | End: 2024-12-24

## 2024-12-24 RX ORDER — AMITRIPTYLINE HYDROCHLORIDE 10 MG/1
10 TABLET, FILM COATED ORAL NIGHTLY
Qty: 90 TABLET | Refills: 3 | Status: SHIPPED | OUTPATIENT
Start: 2024-12-24

## 2024-12-24 ASSESSMENT — PAIN SCALES - GENERAL: PAINLEVEL_OUTOF10: 0-NO PAIN

## 2024-12-24 NOTE — PROGRESS NOTES
Subjective   Patient ID: Sandra Brown is a 74 y.o. female who presents  with chief complaint of SOB.    HPI   This is a 74 year old lady with history of scoliosis, hyperlipidemia, chronic gastritis, status post bilateral cataract removal, s/p colonoscopy 2017, s/p COVID 1/12/2021, osteoporosis with lumbar spine T score -4.5 12/2021.  Patient is presented for evaluation and treatment of the above complaints.  Stated, that has been having episodes of shortness of breath, worse with activity, better at rest.  No cough.nn  Has had episodes of RUQ pain, interested to have Abdominal ultrasound done.  She is here for Vit B12 administration.  Has low back pain episodes.  Has occasional episodes of diarrhea, using Imodium with relief.     Followed  by Gynecology/ urology.  Patient has increase uterine thickness,  had  Biopsy done, was negative     Patient is followed by rheumatology.  She is off Reclast,  having dental issues.  Interested to resume it.    Review of Systems   Constitutional:  Positive for activity change and fatigue.   Respiratory:  Positive for shortness of breath. Negative for cough.    Cardiovascular:  Negative for chest pain, palpitations and leg swelling.       Objective   /62   Pulse 64   Temp 36.3 °C (97.4 °F) (Temporal)   Resp 16   Wt 53.5 kg (118 lb)   BMI 23.05 kg/m²     Physical Exam  HENT:      Head: Normocephalic.      Nose: Nose normal.   Eyes:      Pupils: Pupils are equal, round, and reactive to light.   Cardiovascular:      Rate and Rhythm: Normal rate and regular rhythm.      Heart sounds: Normal heart sounds.   Pulmonary:      Breath sounds: Normal breath sounds.   Abdominal:      Palpations: Abdomen is soft.   Musculoskeletal:         General: Tenderness present.      Comments: scoliosis   Skin:     General: Skin is warm.   Neurological:      Mental Status: She is alert. Mental status is at baseline.   Psychiatric:         Mood and Affect: Mood normal.          Assessment/Plan   Problem List Items Addressed This Visit             ICD-10-CM    GERD (gastroesophageal reflux disease) K21.9    Relevant Medications    omeprazole (PriLOSEC) 40 mg DR capsule    Headache R51.9    Relevant Medications    amitriptyline (Elavil) 10 mg tablet    Hyperlipemia, mixed E78.2    Relevant Medications    rosuvastatin (Crestor) 40 mg tablet    Malaise R53.81    Mild vitamin D deficiency E55.9    Vitamin B12 deficiency E53.8    Relevant Orders    Vitamin B12 (Completed)     Other Visit Diagnoses         Codes    SOB (shortness of breath)    -  Primary R06.02    Relevant Orders    XR chest 2 views (Completed)    ECG 12 Lead (Completed)    Transthoracic Echo (TTE) Complete    Hyperthyroidism     E05.90    Relevant Orders    Thyroid Stimulating Hormone (Completed)    Vitamin D deficiency     E55.9    Relevant Orders    Vitamin D 25-Hydroxy,Total (for eval of Vitamin D levels) (Completed)    Hypothyroidism, unspecified type     E03.9    Hyperlipidemia, unspecified hyperlipidemia type     E78.5    Relevant Orders    Comprehensive Metabolic Panel (Completed)    Lipid Panel (Completed)    Anemia, unspecified type     D64.9    Relevant Orders    CBC w/5 Part Differential, [facility] (Completed)    Ferritin (Completed)    Iron and TIBC (Completed)    Hyperglycemia     R73.9    Relevant Orders    Hemoglobin A1C (Completed)    Abdominal pain, unspecified abdominal location     R10.9    Relevant Orders    US abdomen complete          SOB.  Will obtain EKG, 2D echo.  Follow-up with cardiology.  Will obtain chest x-ray.  May be due to scoliosis.       Vit B12 deficiency  with level of 66.  Vit B12 today IM.  Take 1000 mcg daily.      - S/p EGD 6/15/2023.  No significant abnormalities.      - Pelvic discomfort.  Followed by Dr Martinez.  Had pelvic ultrasound  done, has increased endometrium, up to 9 mm, s/p   Biopsy, negative.     - Bilateral axillary discomfort.  Mammography 1/4/2024.  No masses,  tenderness.     R sided  back skin lesion.  See dermatology .     - R hip pain.   Resolved.  Take Tylenol as needed.     - Hyperlipidemia.  Seen by DR Carl 5/2024, started on Rosuvastatin.  Keep Mediterranean diet, exercise.     - H of acute sinusitis.  Had completed doxycycline, benzonatate.  Improved.     - Osteoporosis.  Last DEXA scan was done 12/21/2021, repeat.  T score at her LS spine -4.5.  Evaluated by rheumatology, had 2 injections of Reclast.  Off now, due to dental work.  Calcium supplements, vitamin D.  Exercises much as you can.     - Headaches episodes.  Discussed with patient about CT scan results.  Started on physical therapy.  Take amitriptyline daily.  Improved.     - Frequent herpes outbreaks.  Continue with herpes prophylaxis.     - H of COVID -19 illness 1/2021.  Patient has had vaccination in August.  Slowly recovered.     - Hypertension.  Stable.  Continue to take current therapy.   Exercise.  Avoid salt.     - Scoliosis.  Continue with daily exercise.     - Health maintenance.  GYN exam Dr. Sierra.  Mammography  1/2024.  Colonoscopy 2017, done at [place].  Vaccinations.  Medicare wellness annual exam is  up to date.     - Normal Adult weight with BMI 23.05  Diet, exercise.  Keep ideal BMI less than 25.

## 2024-12-26 VITALS
HEART RATE: 64 BPM | WEIGHT: 118 LBS | SYSTOLIC BLOOD PRESSURE: 122 MMHG | DIASTOLIC BLOOD PRESSURE: 62 MMHG | TEMPERATURE: 97.4 F | RESPIRATION RATE: 16 BRPM | BODY MASS INDEX: 23.05 KG/M2

## 2024-12-26 LAB
25(OH)D3 SERPL-MCNC: 42 NG/ML (ref 30–100)
ALBUMIN SERPL BCP-MCNC: 3.7 G/DL (ref 3.4–5)
ALP SERPL-CCNC: 47 U/L (ref 45–117)
ALT SERPL W P-5'-P-CCNC: 19 U/L (ref 16–63)
ANION GAP SERPL CALC-SCNC: 21 MMOL/L (ref 10–20)
AST SERPL W P-5'-P-CCNC: 18 U/L (ref 15–37)
BILIRUB SERPL-MCNC: 0.5 MG/DL (ref 0.2–1)
BUN SERPL-MCNC: 12 MG/DL (ref 7–18)
CALCIUM SERPL-MCNC: 9.1 MG/DL (ref 8.5–10.1)
CHLORIDE SERPL-SCNC: 100 MMOL/L (ref 98–107)
CHOLEST SERPL-MCNC: 205 MG/DL (ref 0–199)
CHOLESTEROL/HDL RATIO: 2.8 (ref 4.2–7)
CO2 SERPL-SCNC: 23 MMOL/L (ref 21–32)
CREAT SERPL-MCNC: 0.72 MG/DL (ref 0.6–1.1)
EGFRCR SERPLBLD CKD-EPI 2021: 89 ML/MIN/1.73M*2
GLUCOSE SERPL-MCNC: 94 MG/DL (ref 74–100)
HDLC SERPL-MCNC: 73 MG/DL (ref 40–59)
IS PATIENT FASTING: ABNORMAL
LDLC SERPL DIRECT ASSAY-MCNC: 108 MG/DL (ref 0–100)
POTASSIUM SERPL-SCNC: 4 MMOL/L (ref 3.5–5.1)
PROT SERPL-MCNC: 7.4 G/DL (ref 6.4–8.2)
SODIUM SERPL-SCNC: 140 MMOL/L (ref 136–145)
TRIGL SERPL-MCNC: 66 MG/DL
TSH SERPL-ACNC: 1.88 MIU/L (ref 0.44–3.98)
VIT B12 SERPL-MCNC: 3163 PG/ML (ref 193–986)

## 2024-12-26 ASSESSMENT — ENCOUNTER SYMPTOMS
PALPITATIONS: 0
SHORTNESS OF BREATH: 1
FATIGUE: 1
COUGH: 0
ACTIVITY CHANGE: 1

## 2024-12-27 DIAGNOSIS — Z12.31 ENCOUNTER FOR SCREENING MAMMOGRAM FOR MALIGNANT NEOPLASM OF BREAST: Primary | ICD-10-CM

## 2024-12-30 DIAGNOSIS — K21.9 GASTROESOPHAGEAL REFLUX DISEASE, UNSPECIFIED WHETHER ESOPHAGITIS PRESENT: ICD-10-CM

## 2025-01-03 ENCOUNTER — HOSPITAL ENCOUNTER (OUTPATIENT)
Dept: RADIOLOGY | Facility: CLINIC | Age: 75
Discharge: HOME | End: 2025-01-03
Payer: MEDICARE

## 2025-01-03 DIAGNOSIS — R10.9 ABDOMINAL PAIN, UNSPECIFIED ABDOMINAL LOCATION: ICD-10-CM

## 2025-01-03 PROCEDURE — 76700 US EXAM ABDOM COMPLETE: CPT

## 2025-01-03 RX ORDER — OMEPRAZOLE 40 MG/1
40 CAPSULE, DELAYED RELEASE ORAL DAILY
Qty: 90 CAPSULE | Refills: 0 | Status: SHIPPED | OUTPATIENT
Start: 2025-01-03 | End: 2025-04-03

## 2025-01-03 NOTE — TELEPHONE ENCOUNTER
Pt needs a mammogram order, and she goes to [place] wants us to mail it to her home address please, thank you.

## 2025-01-06 DIAGNOSIS — N28.1 KIDNEY CYSTS: Primary | ICD-10-CM

## 2025-01-10 ENCOUNTER — APPOINTMENT (OUTPATIENT)
Dept: CARDIOLOGY | Facility: CLINIC | Age: 75
End: 2025-01-10
Payer: MEDICARE

## 2025-01-17 ENCOUNTER — ANCILLARY PROCEDURE (OUTPATIENT)
Dept: CARDIOLOGY | Facility: CLINIC | Age: 75
End: 2025-01-17
Payer: MEDICARE

## 2025-01-17 DIAGNOSIS — R06.02 SOB (SHORTNESS OF BREATH): ICD-10-CM

## 2025-01-17 LAB
AORTIC VALVE MEAN GRADIENT: 3 MMHG
AORTIC VALVE PEAK VELOCITY: 1.39 M/S
AV PEAK GRADIENT: 8 MMHG
AVA (PEAK VEL): 1.61 CM2
AVA (VTI): 1.63 CM2
EJECTION FRACTION APICAL 4 CHAMBER: 54.7
EJECTION FRACTION: 67 %
LEFT ATRIUM VOLUME AREA LENGTH INDEX BSA: 21.4 ML/M2
LEFT VENTRICLE INTERNAL DIMENSION DIASTOLE: 4.3 CM (ref 3.5–6)
LEFT VENTRICULAR OUTFLOW TRACT DIAMETER: 1.96 CM
MITRAL VALVE E/A RATIO: 0.53
RIGHT VENTRICLE FREE WALL PEAK S': 11 CM/S
RIGHT VENTRICLE PEAK SYSTOLIC PRESSURE: 22.4 MMHG
TRICUSPID ANNULAR PLANE SYSTOLIC EXCURSION: 1.7 CM

## 2025-01-17 PROCEDURE — 93306 TTE W/DOPPLER COMPLETE: CPT

## 2025-01-17 PROCEDURE — 93306 TTE W/DOPPLER COMPLETE: CPT | Performed by: INTERNAL MEDICINE

## 2025-01-20 DIAGNOSIS — R10.9 ABDOMINAL PAIN, UNSPECIFIED ABDOMINAL LOCATION: Primary | ICD-10-CM

## 2025-01-20 DIAGNOSIS — Z12.11 ENCOUNTER FOR SCREENING COLONOSCOPY: ICD-10-CM

## 2025-01-27 DIAGNOSIS — Z12.11 COLON CANCER SCREENING: ICD-10-CM

## 2025-01-27 RX ORDER — POLYETHYLENE GLYCOL 3350, SODIUM SULFATE ANHYDROUS, SODIUM BICARBONATE, SODIUM CHLORIDE, POTASSIUM CHLORIDE 236; 22.74; 6.74; 5.86; 2.97 G/4L; G/4L; G/4L; G/4L; G/4L
POWDER, FOR SOLUTION ORAL
Qty: 4000 ML | Refills: 0 | Status: SHIPPED | OUTPATIENT
Start: 2025-01-27

## 2025-02-21 DIAGNOSIS — Z12.11 COLON CANCER SCREENING: ICD-10-CM

## 2025-02-21 RX ORDER — POLYETHYLENE GLYCOL 3350, SODIUM SULFATE ANHYDROUS, SODIUM BICARBONATE, SODIUM CHLORIDE, POTASSIUM CHLORIDE 236; 22.74; 6.74; 5.86; 2.97 G/4L; G/4L; G/4L; G/4L; G/4L
POWDER, FOR SOLUTION ORAL
Qty: 4000 ML | Refills: 0 | Status: SHIPPED | OUTPATIENT
Start: 2025-02-21

## 2025-03-21 ENCOUNTER — APPOINTMENT (OUTPATIENT)
Dept: GASTROENTEROLOGY | Facility: EXTERNAL LOCATION | Age: 75
End: 2025-03-21
Payer: MEDICARE

## 2025-03-21 DIAGNOSIS — R10.9 ABDOMINAL PAIN, UNSPECIFIED ABDOMINAL LOCATION: ICD-10-CM

## 2025-03-21 DIAGNOSIS — Z12.11 ENCOUNTER FOR SCREENING COLONOSCOPY: ICD-10-CM

## 2025-03-21 DIAGNOSIS — D12.2 BENIGN NEOPLASM OF ASCENDING COLON: ICD-10-CM

## 2025-03-21 DIAGNOSIS — D51.8 OTHER VITAMIN B12 DEFICIENCY ANEMIA: ICD-10-CM

## 2025-03-21 DIAGNOSIS — D12.0 BENIGN NEOPLASM OF CECUM: ICD-10-CM

## 2025-03-21 DIAGNOSIS — D12.5 BENIGN NEOPLASM OF SIGMOID COLON: ICD-10-CM

## 2025-03-21 DIAGNOSIS — R12 HEARTBURN: Primary | ICD-10-CM

## 2025-03-21 PROCEDURE — 1157F ADVNC CARE PLAN IN RCRD: CPT | Performed by: INTERNAL MEDICINE

## 2025-03-21 PROCEDURE — 43239 EGD BIOPSY SINGLE/MULTIPLE: CPT | Performed by: INTERNAL MEDICINE

## 2025-03-21 PROCEDURE — 45385 COLONOSCOPY W/LESION REMOVAL: CPT | Performed by: INTERNAL MEDICINE

## 2025-03-21 PROCEDURE — 88305 TISSUE EXAM BY PATHOLOGIST: CPT | Performed by: PATHOLOGY

## 2025-03-21 PROCEDURE — 88305 TISSUE EXAM BY PATHOLOGIST: CPT

## 2025-03-21 PROCEDURE — 1123F ACP DISCUSS/DSCN MKR DOCD: CPT | Performed by: INTERNAL MEDICINE

## 2025-03-24 ENCOUNTER — LAB REQUISITION (OUTPATIENT)
Dept: LAB | Facility: HOSPITAL | Age: 75
End: 2025-03-24
Payer: MEDICARE

## 2025-03-24 DIAGNOSIS — R10.9 UNSPECIFIED ABDOMINAL PAIN: ICD-10-CM

## 2025-03-31 DIAGNOSIS — R10.9 ABDOMINAL PAIN, UNSPECIFIED ABDOMINAL LOCATION: Primary | ICD-10-CM

## 2025-03-31 LAB
LABORATORY COMMENT REPORT: NORMAL
PATH REPORT.FINAL DX SPEC: NORMAL
PATH REPORT.GROSS SPEC: NORMAL
PATH REPORT.RELEVANT HX SPEC: NORMAL
PATH REPORT.TOTAL CANCER: NORMAL

## 2025-03-31 NOTE — RESULT ENCOUNTER NOTE
Duodenal biopsies show increased lymphocytes, potentially related to celiac although nonspecific.    - Will check serologies.    Colon polyps tubular adenomas-in view of number, will repeat colonoscopy in 3 years.

## 2025-04-14 ENCOUNTER — APPOINTMENT (OUTPATIENT)
Dept: PRIMARY CARE | Facility: CLINIC | Age: 75
End: 2025-04-14
Payer: MEDICARE

## 2025-04-14 VITALS
RESPIRATION RATE: 16 BRPM | BODY MASS INDEX: 22.26 KG/M2 | HEART RATE: 64 BPM | WEIGHT: 114 LBS | TEMPERATURE: 97.9 F | DIASTOLIC BLOOD PRESSURE: 78 MMHG | SYSTOLIC BLOOD PRESSURE: 132 MMHG

## 2025-04-14 DIAGNOSIS — R10.9 ABDOMINAL PAIN, UNSPECIFIED ABDOMINAL LOCATION: Primary | ICD-10-CM

## 2025-04-14 DIAGNOSIS — I10 PRIMARY HYPERTENSION: ICD-10-CM

## 2025-04-14 DIAGNOSIS — K21.9 GASTROESOPHAGEAL REFLUX DISEASE, UNSPECIFIED WHETHER ESOPHAGITIS PRESENT: ICD-10-CM

## 2025-04-14 PROCEDURE — 1126F AMNT PAIN NOTED NONE PRSNT: CPT | Performed by: INTERNAL MEDICINE

## 2025-04-14 PROCEDURE — 99214 OFFICE O/P EST MOD 30 MIN: CPT | Performed by: INTERNAL MEDICINE

## 2025-04-14 PROCEDURE — 1160F RVW MEDS BY RX/DR IN RCRD: CPT | Performed by: INTERNAL MEDICINE

## 2025-04-14 PROCEDURE — 3078F DIAST BP <80 MM HG: CPT | Performed by: INTERNAL MEDICINE

## 2025-04-14 PROCEDURE — 3075F SYST BP GE 130 - 139MM HG: CPT | Performed by: INTERNAL MEDICINE

## 2025-04-14 PROCEDURE — 1159F MED LIST DOCD IN RCRD: CPT | Performed by: INTERNAL MEDICINE

## 2025-04-14 PROCEDURE — 1123F ACP DISCUSS/DSCN MKR DOCD: CPT | Performed by: INTERNAL MEDICINE

## 2025-04-14 PROCEDURE — 1157F ADVNC CARE PLAN IN RCRD: CPT | Performed by: INTERNAL MEDICINE

## 2025-04-14 PROCEDURE — 1036F TOBACCO NON-USER: CPT | Performed by: INTERNAL MEDICINE

## 2025-04-14 PROCEDURE — G2211 COMPLEX E/M VISIT ADD ON: HCPCS | Performed by: INTERNAL MEDICINE

## 2025-04-14 RX ORDER — LOSARTAN POTASSIUM 25 MG/1
25 TABLET ORAL DAILY
Qty: 90 TABLET | Refills: 2 | Status: SHIPPED | OUTPATIENT
Start: 2025-04-14 | End: 2025-07-13

## 2025-04-14 RX ORDER — OMEPRAZOLE 40 MG/1
40 CAPSULE, DELAYED RELEASE ORAL DAILY
Qty: 90 CAPSULE | Refills: 3 | Status: SHIPPED | OUTPATIENT
Start: 2025-04-14 | End: 2025-07-13

## 2025-04-14 ASSESSMENT — ENCOUNTER SYMPTOMS
BACK PAIN: 0
ACTIVITY CHANGE: 0
APPETITE CHANGE: 0
ABDOMINAL PAIN: 0
ABDOMINAL DISTENTION: 0
ARTHRALGIAS: 1
UNEXPECTED WEIGHT CHANGE: 0

## 2025-04-14 ASSESSMENT — PAIN SCALES - GENERAL: PAINLEVEL_OUTOF10: 0-NO PAIN

## 2025-04-14 NOTE — PROGRESS NOTES
Subjective   Patient ID: Sandra Brown is a 75 y.o. female who presents for follow-up.  Having episodes of abdominal pain, bloating.  Weak at time.  Interested to have her fasting blood work done.  Concerned about gluten..    HPI     This is a 75 year old lady with history of scoliosis, hyperlipidemia, chronic gastritis, status post bilateral cataract removal, s/p colonoscopy 2017, s/p COVID 1/12/2021, osteoporosis with lumbar spine T score -4.5 12/2021.  Patient is presented for evaluation and treatment of the above complaints.  Was evaluated by GI, has had EGD, colonoscopy 3/21/2025.  Followed by Dr. Rosales.  Has had multiple adenomas removed, advised to repeat colonoscopy in 3 years.  Had gastritis.  Has low back pain episodes.  Has occasional episodes of diarrhea, using Imodium with relief.     Followed  by Gynecology/ urology.  Patient has increase uterine thickness,  had  Biopsy done, was negative     Patient is followed by rheumatology.  She is off Reclast,  having dental issues.  Interested to resume it.    Review of Systems   Constitutional:  Negative for activity change, appetite change and unexpected weight change.   Gastrointestinal:  Negative for abdominal distention and abdominal pain.   Musculoskeletal:  Positive for arthralgias. Negative for back pain.       Objective   /78   Pulse 64   Temp 36.6 °C (97.9 °F) (Temporal)   Resp 16   Wt 51.7 kg (114 lb)   BMI 22.26 kg/m²     Physical Exam  Constitutional:       Appearance: Normal appearance.   HENT:      Head: Normocephalic.      Nose: Nose normal.   Eyes:      Pupils: Pupils are equal, round, and reactive to light.   Cardiovascular:      Rate and Rhythm: Regular rhythm.      Heart sounds: Normal heart sounds.   Pulmonary:      Breath sounds: Normal breath sounds.   Abdominal:      Palpations: Abdomen is soft.   Musculoskeletal:      Cervical back: Neck supple.   Skin:     General: Skin is warm.   Neurological:      Mental Status: She is  alert. Mental status is at baseline.   Psychiatric:         Mood and Affect: Mood normal.         Assessment/Plan   Problem List Items Addressed This Visit             ICD-10-CM    GERD (gastroesophageal reflux disease) K21.9    Relevant Medications    omeprazole (PriLOSEC) 40 mg DR capsule    HTN (hypertension) I10    Relevant Medications    losartan (Cozaar) 25 mg tablet     Other Visit Diagnoses         Codes    Abdominal pain, unspecified abdominal location    -  Primary R10.9    Relevant Orders    Celiac Panel    Gluten IgG          Abdominal discomfort.  Patient has had EGD, colonoscopy 3/21/2025.  Had multiple polyps removed, advised to repeat colonoscopy in 3 years.  EGD showed gastritis.    - Hypertension.  Stable.  Continue to take current therapy.   Exercise.  Avoid salt.     - Scoliosis.  Continue with daily exercise.     Kidney cysts.  MRI of kidney is ordered.     Vitamin B12 deficiency.  Has been taking vitamin B12, supplemented.  Loss of vitamin B12.  Repeat the level.      - Pelvic discomfort.  Followed by Dr Martinez.  Had pelvic ultrasound  done, has increased endometrium, up to 9 mm, s/p   Biopsy, negative.     - Bilateral axillary discomfort.  Mammography 1/4/2024.  No masses, tenderness.     R sided  back skin lesion.  See dermatology .     - R hip pain.   Resolved.  Take Tylenol as needed.     - Hyperlipidemia.  Seen by DR Carl 5/2024, started on Rosuvastatin.  Keep Mediterranean diet, exercise.  Return for fasting lipid panel.     - Osteoporosis.  Last DEXA scan was done 12/21/2021, repeat.  T score at her LS spine -4.5.  Evaluated by rheumatology, had 2 injections of Reclast.  Off now, due to dental work.  Calcium supplements, vitamin D.  Exercises much as you can.     - Headaches episodes.  Discussed with patient about CT scan results.  Started on physical therapy.  Take amitriptyline daily.  Improved.     - Frequent herpes outbreaks.  Continue with herpes prophylaxis.     - H of COVID  -19 illness 1/2021.  Patient has had vaccination in August.  Slowly recovered.        - Health maintenance.  GYN exam Dr. Sierra.  Mammography  1/2024.  Colonoscopy 2017, done at [place].  Vaccinations.  Medicare wellness annual exam is  up to date.     - Normal Adult weight with BMI 23.05  Diet, exercise.  Keep ideal BMI less than 25.

## 2025-04-17 ENCOUNTER — CLINICAL SUPPORT (OUTPATIENT)
Dept: PRIMARY CARE | Facility: CLINIC | Age: 75
End: 2025-04-17
Payer: MEDICARE

## 2025-04-17 DIAGNOSIS — Z51.6 ALLERGY DESENSITIZATION THERAPY: ICD-10-CM

## 2025-04-17 DIAGNOSIS — E53.8 VITAMIN B12 DEFICIENCY: ICD-10-CM

## 2025-04-17 DIAGNOSIS — E55.9 MILD VITAMIN D DEFICIENCY: ICD-10-CM

## 2025-04-17 DIAGNOSIS — D50.9 IRON DEFICIENCY ANEMIA, UNSPECIFIED IRON DEFICIENCY ANEMIA TYPE: ICD-10-CM

## 2025-04-17 DIAGNOSIS — R53.81 MALAISE: ICD-10-CM

## 2025-04-17 DIAGNOSIS — E78.5 HYPERLIPIDEMIA, UNSPECIFIED HYPERLIPIDEMIA TYPE: ICD-10-CM

## 2025-04-17 DIAGNOSIS — E55.9 VITAMIN D DEFICIENCY: ICD-10-CM

## 2025-04-17 DIAGNOSIS — T78.40XS ALLERGY, SEQUELA: ICD-10-CM

## 2025-04-17 DIAGNOSIS — Z78.9 ALLERGY HISTORY UNKNOWN: ICD-10-CM

## 2025-04-17 DIAGNOSIS — R73.9 HYPERGLYCEMIA: ICD-10-CM

## 2025-04-17 DIAGNOSIS — R10.9 ABDOMINAL PAIN, UNSPECIFIED ABDOMINAL LOCATION: Primary | ICD-10-CM

## 2025-04-17 DIAGNOSIS — Z91.018 ALLERGY TO GLUTEN: ICD-10-CM

## 2025-04-17 DIAGNOSIS — E78.2 HYPERLIPEMIA, MIXED: ICD-10-CM

## 2025-04-17 LAB
25(OH)D3 SERPL-MCNC: 34 NG/ML (ref 30–100)
ALBUMIN SERPL BCP-MCNC: 4.2 G/DL (ref 3.4–5)
ALP SERPL-CCNC: 81 U/L (ref 45–117)
ALT SERPL W P-5'-P-CCNC: 28 U/L (ref 16–63)
ANION GAP SERPL CALC-SCNC: 12 MMOL/L (ref 10–20)
AST SERPL W P-5'-P-CCNC: 22 U/L (ref 15–37)
BASOPHILS # BLD AUTO: 0.03 X10*3/UL (ref 0.1–1.6)
BASOPHILS NFR BLD AUTO: 0.55 % (ref 0–0.3)
BILIRUB SERPL-MCNC: 0.4 MG/DL (ref 0.2–1)
BUN SERPL-MCNC: 11 MG/DL (ref 7–18)
CALCIUM SERPL-MCNC: 9.3 MG/DL (ref 8.5–10.1)
CHLORIDE SERPL-SCNC: 102 MMOL/L (ref 98–107)
CHOLEST SERPL-MCNC: 199 MG/DL (ref 0–199)
CHOLESTEROL/HDL RATIO: 2.6 (ref 4.2–7)
CO2 SERPL-SCNC: 31 MMOL/L (ref 21–32)
CREAT SERPL-MCNC: 0.69 MG/DL (ref 0.6–1.1)
EGFRCR SERPLBLD CKD-EPI 2021: >90 ML/MIN/1.73M*2
EOSINOPHIL # BLD AUTO: 0.19 X10*3/UL (ref 0.04–0.5)
EOSINOPHIL NFR BLD AUTO: 3 % (ref 0.7–7)
ERYTHROCYTE [DISTWIDTH] IN BLOOD BY AUTOMATED COUNT: 15.4 % (ref 11.5–14.5)
GLUCOSE SERPL-MCNC: 91 MG/DL (ref 74–100)
HBA1C MFR BLD: 5.7 %
HCT VFR BLD AUTO: 40.1 % (ref 36.6–46.6)
HDLC SERPL-MCNC: 76 MG/DL (ref 40–59)
HGB BLD-MCNC: 13.62 G/DL (ref 12–15.4)
IS PATIENT FASTING: YES
LDLC SERPL DIRECT ASSAY-MCNC: 99 MG/DL (ref 0–100)
LYMPHOCYTES # BLD AUTO: 1.96 X10*3/UL (ref 0–6)
LYMPHOCYTES NFR BLD AUTO: 30.85 % (ref 20.5–51.1)
MCH RBC QN AUTO: 29.3 PG (ref 26–32)
MCHC RBC AUTO-ENTMCNC: 34 G/DL (ref 31–38)
MCV RBC AUTO: 86.2 FL (ref 80–96)
MONOCYTES # BLD AUTO: 0.51 X10*3/UL (ref 1.6–24.9)
MONOCYTES NFR BLD AUTO: 7.99 % (ref 1.7–9.3)
NEUTROPHILS # BLD AUTO: 3.65 X10*3/UL (ref 1.4–6.5)
NEUTROPHILS NFR BLD AUTO: 57.61 % (ref 42.2–75.2)
PLATELET # BLD AUTO: 331.1 X10*3/UL (ref 150–450)
PMV BLD AUTO: 9.37 FL (ref 7.8–11)
POTASSIUM SERPL-SCNC: 3.8 MMOL/L (ref 3.5–5.1)
PROT SERPL-MCNC: 8.3 G/DL (ref 6.4–8.2)
RBC # BLD AUTO: 4.65 X10*6/UL (ref 3.9–5.3)
SODIUM SERPL-SCNC: 141 MMOL/L (ref 136–145)
TRIGL SERPL-MCNC: 52 MG/DL
TSH SERPL-ACNC: 3.03 MIU/L (ref 0.44–3.98)
VIT B12 SERPL-MCNC: 565 PG/ML (ref 193–986)
WBC # BLD AUTO: 6.34 X10*3/UL (ref 4.5–10.5)

## 2025-04-17 PROCEDURE — 82607 VITAMIN B-12: CPT | Performed by: INTERNAL MEDICINE

## 2025-04-17 PROCEDURE — 83516 IMMUNOASSAY NONANTIBODY: CPT

## 2025-04-17 PROCEDURE — 82306 VITAMIN D 25 HYDROXY: CPT | Performed by: INTERNAL MEDICINE

## 2025-04-17 PROCEDURE — 84443 ASSAY THYROID STIM HORMONE: CPT | Performed by: INTERNAL MEDICINE

## 2025-04-17 PROCEDURE — 80053 COMPREHEN METABOLIC PANEL: CPT | Performed by: INTERNAL MEDICINE

## 2025-04-17 PROCEDURE — 83036 HEMOGLOBIN GLYCOSYLATED A1C: CPT | Performed by: INTERNAL MEDICINE

## 2025-04-17 PROCEDURE — 86001 ALLERGEN SPECIFIC IGG: CPT

## 2025-04-17 PROCEDURE — 36415 COLL VENOUS BLD VENIPUNCTURE: CPT

## 2025-04-18 LAB
GLIADIN PEPTIDE IGA SER IA-ACNC: <1 U/ML
TTG IGA SER IA-ACNC: <1 U/ML

## 2025-04-19 LAB
GLIADIN PEPTIDE IGG SER IA-ACNC: <0.56 FLU (ref 0–4.99)
TTG IGG SER IA-ACNC: <0.82 FLU (ref 0–4.99)

## 2025-04-22 LAB — QUEST FLEXITEST1 RESULTS:: NORMAL

## 2025-04-24 LAB — GLUTEN IGG-MCNC: 7.3 MCG/ML

## 2025-05-09 DIAGNOSIS — N28.1 KIDNEY CYSTS: Primary | ICD-10-CM

## 2025-05-20 ENCOUNTER — HOSPITAL ENCOUNTER (OUTPATIENT)
Dept: RADIOLOGY | Facility: CLINIC | Age: 75
Discharge: HOME | End: 2025-05-20
Payer: MEDICARE

## 2025-05-20 DIAGNOSIS — N28.1 KIDNEY CYSTS: ICD-10-CM

## 2025-05-20 PROCEDURE — 2550000001 HC RX 255 CONTRASTS: Mod: JZ | Performed by: INTERNAL MEDICINE

## 2025-05-20 PROCEDURE — A9575 INJ GADOTERATE MEGLUMI 0.1ML: HCPCS | Mod: JZ | Performed by: INTERNAL MEDICINE

## 2025-05-20 PROCEDURE — 74183 MRI ABD W/O CNTR FLWD CNTR: CPT

## 2025-05-20 RX ORDER — GADOTERATE MEGLUMINE 376.9 MG/ML
10 INJECTION INTRAVENOUS
Status: COMPLETED | OUTPATIENT
Start: 2025-05-20 | End: 2025-05-20

## 2025-05-20 RX ADMIN — GADOTERATE MEGLUMINE 10 ML: 376.9 INJECTION INTRAVENOUS at 10:47

## 2025-07-02 ENCOUNTER — APPOINTMENT (OUTPATIENT)
Dept: PRIMARY CARE | Facility: CLINIC | Age: 75
End: 2025-07-02
Payer: MEDICARE

## 2025-08-04 ENCOUNTER — APPOINTMENT (OUTPATIENT)
Dept: OBSTETRICS AND GYNECOLOGY | Facility: CLINIC | Age: 75
End: 2025-08-04
Payer: MEDICARE

## 2025-08-06 NOTE — PROGRESS NOTES
Urogynecology  Provider:  Vale Martinez MD  [phone]    ASSESSMENT AND PLAN:   75 year old female with AMH, constipation, and bowel spasms. Comorbidities include: HTN and GERD.     Diagnoses:  #1 Asymptomatic microscopic hematuria  #2 Bowel spasms   #3 Chronic constipation     Plan:  1. AMH  - POCT with moderate blood which is her baseline.   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     2. Bowel spasms, chronic constipation   - Recommended trying Smooth Move tea PRN and daily MiraLAX in addition to continuing her Swiss [place] to help further manage her constipation as this is probably the root cause of her occasional abdominal pain.   - Sent Rx to start her on Bentyl 10 mg daily to help reduce bowel spasms.     Her mammogram is up to date.     Follow up in 1 year with Dr. Martinez. She may return to clinic with any new or worsening problems.     Scribe Attestation  By signing my name below, I, Marcello Linn, Scribe, attest that this documentation has been prepared under the direction and in the presence of Vale Martinez MD on 08/07/2025 at 6:34 PM.     Agree with above. I Dr. Martinez, personally performed the services described in the documentation which was scribed virtually and confirm it is both complete and accurate.  Vale Martinez MD        Problem List Items Addressed This Visit    None  Visit Diagnoses         Asymptomatic microscopic hematuria    -  Primary    Relevant Orders    POCT UA Automated manually resulted (Completed)      Hematuria, microscopic        Relevant Orders    Urine Culture      Spasm of bowel        Relevant Medications    dicyclomine (Bentyl) 10 mg capsule                I spent a total of eConsult Time: 30 minutes in face to face and non face to face time.        Vale Martinez MD        HISTORY OF PRESENT ILLNESS:   75 year old female  presenting in follow up for AMH and constipation.     Record Review:   - Last visit 8/2024  Diagnoses:   #1 Hematuria   #2 Constipation       Plan:   1. AMH   - She had a cysto on 1/18/2024 that was normal and she had a CT urogram on 12/2/23 that showed simple renal cysts only.    - Urine shows small blood, which is her baseline.      2. Pain from constipation   - We discussed increasing fluid intake particularly with hot beverages and caffeine to stimulate the bowels. She can also add Miralax PRN as well with the goal of having soft, formed stool.      Exam last year with no POP    Follow-up in 1 year with Dr. Martinez.       Urinary Symptoms:   - Previously she had a small kidney cyst on the right upon prior kidney MRI imaging from 5/21/2025.   - No episodes of gross hematuria.     Bowel Symptoms:   - She reports experiencing persistent constipation and is currently taking Swiss Shawnee tablets from [employer] and eating a lot of fruits, which has somewhat improved her constipation.   - She mentions occasional right-sided abdominal pain, which is probably related to constipation.  - Patient mentions occasional right-sided abdominal pain, which is believed to be related to constipation.  - She has not been using MiraLAX regularly but is considering starting it.      Past Medical History:    Medical History[1]       Past Surgical History:     Surgical History[2]      Medications:     Prior to Admission medications   Medication Sig Start Date End Date Taking? Authorizing Provider   acyclovir (Zovirax) 400 mg tablet Take 1 tablet (400 mg) by mouth once daily. 12/10/21   Historical Provider, MD   amitriptyline (Elavil) 10 mg tablet Take 1 tablet (10 mg) by mouth once daily at bedtime. 12/24/24   Alexia Perry MD   estradiol (Estrace) 0.01 % (0.1 mg/gram) vaginal cream APPLY ONE GRAM TO VAGINA TWO NIGHTS PER WEEK 6/5/23   Historical Provider, MD   ipratropium (Atrovent) 42 mcg (0.06 %) nasal spray Administer 2 sprays  "into each nostril 4 times a day. 7/2/24 7/2/25  Alexia Perry MD   losartan (Cozaar) 25 mg tablet Take 1 tablet (25 mg) by mouth once daily. 4/14/25 7/13/25  Alexia Perry MD   omeprazole (PriLOSEC) 40 mg DR capsule Take 1 capsule (40 mg) by mouth once daily. Swallow whole. Do not crush or chew. 4/14/25 7/13/25  Alexia Perry MD   polyethylene glycol (GoLYTELY) 236-22.74-6.74 -5.86 gram solution Drink 1/2 starting at 6 pm the night before your procedure then drink the 2nd 1/2 5 hours before procedure arrival time 2/21/25   Artis Rosales MD   rosuvastatin (Crestor) 40 mg tablet Take 1 tablet (40 mg) by mouth once daily. 12/24/24 1/28/26  Alexia Perry MD   trospium (Sanctura) 20 mg tablet TAKE 0.5 TABLETS (10 MG) BY MOUTH 2 TIMES A DAY AS NEEDED (1/2 TO 1 TABLET AS NEEDED FOR FREQUENCY).  Patient not taking: Reported on 7/26/2024 11/7/23   Vale Martinez MD   zoledronic acid/mannitol-water (RECLAST IV) Infuse 5 mg into a venous catheter yearly.    Historical Provider, MD ZHENG  Review of Systems   Constitutional: Negative.    HENT: Negative.     Eyes: Negative.    Respiratory: Negative.     Cardiovascular: Negative.    Gastrointestinal: Negative.    Endocrine: Negative.    Genitourinary: Negative.    Musculoskeletal: Negative.    Neurological: Negative.    Psychiatric/Behavioral: Negative.          Blood, Urine   Date Value Ref Range Status   08/01/2024 NEGATIVE NEGATIVE Final     Poc Nitrite, Urine   Date Value Ref Range Status   01/18/2024 NEGATIVE NEGATIVE Final     Nitrite, Urine   Date Value Ref Range Status   08/01/2024 NEGATIVE NEGATIVE Final     Urobilinogen, Urine   Date Value Ref Range Status   08/01/2024 Normal Normal mg/dL Final         PHYSICAL EXAM:    /79 (Patient Position: Sitting)   Pulse 81   Ht (!) 1.549 m (5' 1\")   Wt 54.4 kg (120 lb)   BMI 22.67 kg/m²   No LMP recorded. Patient is postmenopausal.      Declines chaperone for physical exam.      Well developed, " well nourished, in no apparent distress.   Neurologic/Psychiatric:  Awake, Alert and Oriented times 3.  Affect normal.     GENITAL/URINARY:     External Genitalia:  The patient has normal appearing external genitalia, normal skenes and bartholins glands, and a normal hair distribution.  Her vulva is without lesions, erythema or discharge.  It is non-tender with appropriate sensation.     Urethral Meatus:  Size normal, Location normal, Lesions absent, Prolapse absent.    Urethra:  Fullness absent, Masses absent.    Bladder:  Fullness absent, Masses absent, Tenderness absent.    Vagina:  General appearance normal, Estrogen effect normal, Discharge absent, Lesions absent. No significant pelvic organ prolapse.     Cervix: Normal, no discharge.   Uterus:  normal size, mobile, and nontender  Adnexa: normal, no masses or tenderness over the bilateral adnexa     Anus/Perineum:  Lesions absent and masses absent.     Stress urinary incontinence not demonstrable.         POP-Q:  Stage: 0  Position: sitting        Rectal exam: Normal. Moderate amount of hard stools located high up in the rectum.       Data and DIAGNOSTIC STUDIES REVIEWED   Imaging  No results found.    Cardiology, Vascular, and Other Imaging  No other imaging results found for the past 7 days     Lab Results   Component Value Date    URINECULTURE **Culture Comments - See Below 08/17/2023      Lab Results   Component Value Date    GLUCOSE 91 04/17/2025    CALCIUM 9.3 04/17/2025     04/17/2025    K 3.8 04/17/2025    CO2 31 04/17/2025     04/17/2025    BUN 11 04/17/2025    CREATININE 0.69 04/17/2025     Lab Results   Component Value Date    WBC 6.34 04/17/2025    HGB 13.62 04/17/2025    HCT 40.1 04/17/2025    MCV 86.2 04/17/2025    .1 04/17/2025          Vale Martinez MD           [1] No past medical history on file.  [2]   Past Surgical History:  Procedure Laterality Date    OTHER SURGICAL HISTORY  01/03/2020    Cataract surgery

## 2025-08-07 ENCOUNTER — OFFICE VISIT (OUTPATIENT)
Dept: OBSTETRICS AND GYNECOLOGY | Facility: CLINIC | Age: 75
End: 2025-08-07
Payer: MEDICARE

## 2025-08-07 VITALS
WEIGHT: 120 LBS | HEART RATE: 81 BPM | HEIGHT: 61 IN | DIASTOLIC BLOOD PRESSURE: 79 MMHG | BODY MASS INDEX: 22.66 KG/M2 | SYSTOLIC BLOOD PRESSURE: 122 MMHG

## 2025-08-07 DIAGNOSIS — R31.29 HEMATURIA, MICROSCOPIC: ICD-10-CM

## 2025-08-07 DIAGNOSIS — R31.21 ASYMPTOMATIC MICROSCOPIC HEMATURIA: Primary | ICD-10-CM

## 2025-08-07 DIAGNOSIS — K58.9 SPASM OF BOWEL: ICD-10-CM

## 2025-08-07 LAB
POC APPEARANCE, URINE: CLEAR
POC BILIRUBIN, URINE: NEGATIVE
POC BLOOD, URINE: ABNORMAL
POC COLOR, URINE: YELLOW
POC GLUCOSE, URINE: NEGATIVE MG/DL
POC KETONES, URINE: NEGATIVE MG/DL
POC LEUKOCYTES, URINE: NEGATIVE
POC NITRITE,URINE: NEGATIVE
POC PH, URINE: 6 PH
POC PROTEIN, URINE: NEGATIVE MG/DL
POC SPECIFIC GRAVITY, URINE: 1.01
POC UROBILINOGEN, URINE: 0.2 EU/DL

## 2025-08-07 PROCEDURE — 81003 URINALYSIS AUTO W/O SCOPE: CPT | Mod: QW | Performed by: OBSTETRICS & GYNECOLOGY

## 2025-08-07 PROCEDURE — 99214 OFFICE O/P EST MOD 30 MIN: CPT | Performed by: OBSTETRICS & GYNECOLOGY

## 2025-08-07 PROCEDURE — 1159F MED LIST DOCD IN RCRD: CPT | Performed by: OBSTETRICS & GYNECOLOGY

## 2025-08-07 PROCEDURE — 3078F DIAST BP <80 MM HG: CPT | Performed by: OBSTETRICS & GYNECOLOGY

## 2025-08-07 PROCEDURE — 3008F BODY MASS INDEX DOCD: CPT | Performed by: OBSTETRICS & GYNECOLOGY

## 2025-08-07 PROCEDURE — 3074F SYST BP LT 130 MM HG: CPT | Performed by: OBSTETRICS & GYNECOLOGY

## 2025-08-07 PROCEDURE — 1126F AMNT PAIN NOTED NONE PRSNT: CPT | Performed by: OBSTETRICS & GYNECOLOGY

## 2025-08-07 RX ORDER — DICYCLOMINE HYDROCHLORIDE 10 MG/1
10 CAPSULE ORAL 2 TIMES DAILY PRN
Qty: 60 CAPSULE | Refills: 3 | Status: SHIPPED | OUTPATIENT
Start: 2025-08-07 | End: 2026-08-07

## 2025-08-07 ASSESSMENT — ENCOUNTER SYMPTOMS
CARDIOVASCULAR NEGATIVE: 1
MUSCULOSKELETAL NEGATIVE: 1
NEUROLOGICAL NEGATIVE: 1
CONSTITUTIONAL NEGATIVE: 1
PSYCHIATRIC NEGATIVE: 1
RESPIRATORY NEGATIVE: 1
LOSS OF SENSATION IN FEET: 0
DEPRESSION: 0
ENDOCRINE NEGATIVE: 1
EYES NEGATIVE: 1
OCCASIONAL FEELINGS OF UNSTEADINESS: 0
GASTROINTESTINAL NEGATIVE: 1

## 2025-08-07 ASSESSMENT — PAIN SCALES - GENERAL: PAINLEVEL_OUTOF10: 0-NO PAIN

## 2025-08-09 LAB — BACTERIA UR CULT: NORMAL
